# Patient Record
Sex: FEMALE | Race: WHITE | NOT HISPANIC OR LATINO | Employment: UNEMPLOYED | URBAN - METROPOLITAN AREA
[De-identification: names, ages, dates, MRNs, and addresses within clinical notes are randomized per-mention and may not be internally consistent; named-entity substitution may affect disease eponyms.]

---

## 2018-11-12 ENCOUNTER — HISTORICAL (OUTPATIENT)
Dept: LAB | Facility: HOSPITAL | Age: 48
End: 2018-11-12

## 2018-11-12 LAB
ABS NEUT (OLG): 3 X10(3)/MCL (ref 2.1–9.2)
ALBUMIN SERPL-MCNC: 4 GM/DL (ref 3.4–5)
ALBUMIN/GLOB SERPL: 1.4 RATIO (ref 1.1–2)
ALP SERPL-CCNC: 74 UNIT/L (ref 46–116)
ALT SERPL-CCNC: 21 UNIT/L (ref 12–78)
AST SERPL-CCNC: 13 UNIT/L (ref 15–37)
BASOPHILS # BLD AUTO: 0 X10(3)/MCL (ref 0–0.2)
BASOPHILS NFR BLD AUTO: 0 %
BILIRUB SERPL-MCNC: 0.4 MG/DL (ref 0.2–1)
BILIRUBIN DIRECT+TOT PNL SERPL-MCNC: 0.13 MG/DL (ref 0–0.2)
BILIRUBIN DIRECT+TOT PNL SERPL-MCNC: 0.27 MG/DL (ref 0–0.8)
BUN SERPL-MCNC: 10.2 MG/DL (ref 7–18)
CALCIUM SERPL-MCNC: 10 MG/DL (ref 8.5–10.1)
CHLORIDE SERPL-SCNC: 105 MMOL/L (ref 98–107)
CHOLEST SERPL-MCNC: 193 MG/DL (ref 0–200)
CHOLEST/HDLC SERPL: 3.5 {RATIO} (ref 0–4)
CO2 SERPL-SCNC: 31.9 MMOL/L (ref 21–32)
CREAT SERPL-MCNC: 0.64 MG/DL (ref 0.6–1.3)
DEPRECATED CALCIDIOL+CALCIFEROL SERPL-MC: 16.27 NG/ML (ref 30–80)
EOSINOPHIL # BLD AUTO: 0.1 X10(3)/MCL (ref 0–0.9)
EOSINOPHIL NFR BLD AUTO: 2 %
ERYTHROCYTE [DISTWIDTH] IN BLOOD BY AUTOMATED COUNT: 12.4 % (ref 11.5–17)
GLOBULIN SER-MCNC: 2.9 GM/DL (ref 2.4–3.5)
GLUCOSE SERPL-MCNC: 86 MG/DL (ref 74–106)
HCT VFR BLD AUTO: 44 % (ref 37–47)
HDLC SERPL-MCNC: 55 MG/DL (ref 40–60)
HGB BLD-MCNC: 14.4 GM/DL (ref 12–16)
IMM GRANULOCYTES # BLD AUTO: 0.01 % (ref 0–0.02)
IMM GRANULOCYTES NFR BLD AUTO: 0.2 % (ref 0–0.43)
LDLC SERPL CALC-MCNC: 122 MG/DL (ref 0–129)
LYMPHOCYTES # BLD AUTO: 1.9 X10(3)/MCL (ref 0.6–4.6)
LYMPHOCYTES NFR BLD AUTO: 36 %
MCH RBC QN AUTO: 31.3 PG (ref 27–31)
MCHC RBC AUTO-ENTMCNC: 32.7 GM/DL (ref 33–36)
MCV RBC AUTO: 95.7 FL (ref 80–94)
MONOCYTES # BLD AUTO: 0.3 X10(3)/MCL (ref 0.1–1.3)
MONOCYTES NFR BLD AUTO: 5 %
NEUTROPHILS # BLD AUTO: 3 X10(3)/MCL (ref 1.4–7.9)
NEUTROPHILS NFR BLD AUTO: 57 %
PLATELET # BLD AUTO: 195 X10(3)/MCL (ref 130–400)
PMV BLD AUTO: 10.6 FL (ref 9.4–12.4)
POTASSIUM SERPL-SCNC: 4.7 MMOL/L (ref 3.5–5.1)
PROT SERPL-MCNC: 6.9 GM/DL (ref 6.4–8.2)
RBC # BLD AUTO: 4.6 X10(6)/MCL (ref 4.2–5.4)
SODIUM SERPL-SCNC: 142 MMOL/L (ref 136–145)
TRIGL SERPL-MCNC: 79 MG/DL
TSH SERPL-ACNC: 2.66 MIU/ML (ref 0.36–3.74)
VLDLC SERPL CALC-MCNC: 16 MG/DL
WBC # SPEC AUTO: 5.3 X10(3)/MCL (ref 4.5–11.5)

## 2018-12-13 ENCOUNTER — HISTORICAL (OUTPATIENT)
Dept: RADIOLOGY | Facility: HOSPITAL | Age: 48
End: 2018-12-13

## 2019-01-09 ENCOUNTER — HISTORICAL (OUTPATIENT)
Dept: RADIOLOGY | Facility: HOSPITAL | Age: 49
End: 2019-01-09

## 2019-08-13 ENCOUNTER — HISTORICAL (OUTPATIENT)
Dept: LAB | Facility: HOSPITAL | Age: 49
End: 2019-08-13

## 2019-08-13 LAB
ABS NEUT (OLG): 3.39 X10(3)/MCL (ref 2.1–9.2)
ALBUMIN SERPL-MCNC: 4 GM/DL (ref 3.4–5)
ALBUMIN/GLOB SERPL: 1.2 RATIO (ref 1.1–2)
ALP SERPL-CCNC: 84 UNIT/L (ref 46–116)
ALT SERPL-CCNC: 24 UNIT/L (ref 12–78)
AST SERPL-CCNC: 15 UNIT/L (ref 15–37)
BASOPHILS # BLD AUTO: 0 X10(3)/MCL (ref 0–0.2)
BASOPHILS NFR BLD AUTO: 0 %
BILIRUB SERPL-MCNC: 0.4 MG/DL (ref 0.2–1)
BILIRUBIN DIRECT+TOT PNL SERPL-MCNC: 0.07 MG/DL (ref 0–0.2)
BILIRUBIN DIRECT+TOT PNL SERPL-MCNC: 0.33 MG/DL (ref 0–0.8)
BUN SERPL-MCNC: 14.6 MG/DL (ref 7–18)
CALCIUM SERPL-MCNC: 9.8 MG/DL (ref 8.5–10.1)
CHLORIDE SERPL-SCNC: 105 MMOL/L (ref 98–107)
CHOLEST SERPL-MCNC: 195 MG/DL (ref 0–200)
CHOLEST/HDLC SERPL: 4.1 {RATIO} (ref 0–4)
CO2 SERPL-SCNC: 29.4 MMOL/L (ref 21–32)
CREAT SERPL-MCNC: 0.71 MG/DL (ref 0.6–1.3)
DEPRECATED CALCIDIOL+CALCIFEROL SERPL-MC: 16.37 NG/ML (ref 30–80)
EOSINOPHIL # BLD AUTO: 0.1 X10(3)/MCL (ref 0–0.9)
EOSINOPHIL NFR BLD AUTO: 1 %
ERYTHROCYTE [DISTWIDTH] IN BLOOD BY AUTOMATED COUNT: 12.5 % (ref 11.5–17)
GLOBULIN SER-MCNC: 3.4 GM/DL (ref 2.4–3.5)
GLUCOSE SERPL-MCNC: 94 MG/DL (ref 74–106)
HCT VFR BLD AUTO: 43.2 % (ref 37–47)
HDLC SERPL-MCNC: 48 MG/DL (ref 40–60)
HGB BLD-MCNC: 14.2 GM/DL (ref 12–16)
LDLC SERPL CALC-MCNC: 133 MG/DL (ref 0–129)
LYMPHOCYTES # BLD AUTO: 1.6 X10(3)/MCL (ref 0.6–4.6)
LYMPHOCYTES NFR BLD AUTO: 30 %
MCH RBC QN AUTO: 30.7 PG (ref 27–31)
MCHC RBC AUTO-ENTMCNC: 32.9 GM/DL (ref 33–36)
MCV RBC AUTO: 93.3 FL (ref 80–94)
MONOCYTES # BLD AUTO: 0.3 X10(3)/MCL (ref 0.1–1.3)
MONOCYTES NFR BLD AUTO: 5 %
NEUTROPHILS # BLD AUTO: 3.39 X10(3)/MCL (ref 1.4–7.9)
NEUTROPHILS NFR BLD AUTO: 64 %
PLATELET # BLD AUTO: 207 X10(3)/MCL (ref 130–400)
PMV BLD AUTO: 9.9 FL (ref 9.4–12.4)
POTASSIUM SERPL-SCNC: 4.7 MMOL/L (ref 3.5–5.1)
PROT SERPL-MCNC: 7.4 GM/DL (ref 6.4–8.2)
RBC # BLD AUTO: 4.63 X10(6)/MCL (ref 4.2–5.4)
SODIUM SERPL-SCNC: 142 MMOL/L (ref 136–145)
TRIGL SERPL-MCNC: 69 MG/DL
TSH SERPL-ACNC: 3.54 MIU/ML (ref 0.36–3.74)
VIT B12 SERPL-MCNC: 299 PG/ML (ref 193–986)
VLDLC SERPL CALC-MCNC: 14 MG/DL
WBC # SPEC AUTO: 5.3 X10(3)/MCL (ref 4.5–11.5)

## 2019-12-17 ENCOUNTER — HISTORICAL (OUTPATIENT)
Dept: RADIOLOGY | Facility: HOSPITAL | Age: 49
End: 2019-12-17

## 2020-03-02 ENCOUNTER — HISTORICAL (OUTPATIENT)
Dept: LAB | Facility: HOSPITAL | Age: 50
End: 2020-03-02

## 2020-03-02 LAB
DEPRECATED CALCIDIOL+CALCIFEROL SERPL-MC: 22.07 NG/ML (ref 30–80)
VIT B12 SERPL-MCNC: 462 PG/ML (ref 193–986)

## 2020-08-03 ENCOUNTER — HISTORICAL (OUTPATIENT)
Dept: LAB | Facility: HOSPITAL | Age: 50
End: 2020-08-03

## 2020-08-03 LAB
ABS NEUT (OLG): 2.95 X10(3)/MCL (ref 2.1–9.2)
ALBUMIN SERPL-MCNC: 5.9 GM/DL (ref 3.4–5)
ALBUMIN/GLOB SERPL: 2.6 RATIO (ref 1.1–2)
ALP SERPL-CCNC: 90 UNIT/L (ref 46–116)
ALT SERPL-CCNC: 87 UNIT/L (ref 12–78)
AST SERPL-CCNC: 48 UNIT/L (ref 15–37)
BASOPHILS # BLD AUTO: 0 X10(3)/MCL (ref 0–0.2)
BASOPHILS NFR BLD AUTO: 0 %
BILIRUB SERPL-MCNC: 0.5 MG/DL (ref 0.2–1)
BILIRUBIN DIRECT+TOT PNL SERPL-MCNC: 0.11 MG/DL (ref 0–0.2)
BILIRUBIN DIRECT+TOT PNL SERPL-MCNC: 0.39 MG/DL (ref 0–0.8)
BUN SERPL-MCNC: 14.4 MG/DL (ref 7–18)
CALCIUM SERPL-MCNC: 10.5 MG/DL (ref 8.5–10.1)
CHLORIDE SERPL-SCNC: 104 MMOL/L (ref 98–107)
CHOLEST SERPL-MCNC: 228 MG/DL (ref 0–200)
CHOLEST/HDLC SERPL: 3.1 {RATIO} (ref 0–4)
CO2 SERPL-SCNC: 20.4 MMOL/L (ref 21–32)
CREAT SERPL-MCNC: 0.92 MG/DL (ref 0.6–1.3)
DEPRECATED CALCIDIOL+CALCIFEROL SERPL-MC: 23.1 NG/ML (ref 6.6–49.9)
EOSINOPHIL # BLD AUTO: 0.1 X10(3)/MCL (ref 0–0.9)
EOSINOPHIL NFR BLD AUTO: 2 %
ERYTHROCYTE [DISTWIDTH] IN BLOOD BY AUTOMATED COUNT: 12.7 % (ref 11.5–17)
GLOBULIN SER-MCNC: 2.3 GM/DL (ref 2.4–3.5)
GLUCOSE SERPL-MCNC: 114 MG/DL (ref 74–106)
HCT VFR BLD AUTO: 45.2 % (ref 37–47)
HDLC SERPL-MCNC: 74 MG/DL (ref 40–60)
HGB BLD-MCNC: 15.1 GM/DL (ref 12–16)
LDLC SERPL CALC-MCNC: 133 MG/DL (ref 0–129)
LYMPHOCYTES # BLD AUTO: 1.6 X10(3)/MCL (ref 0.6–4.6)
LYMPHOCYTES NFR BLD AUTO: 32 %
MCH RBC QN AUTO: 31.1 PG (ref 27–31)
MCHC RBC AUTO-ENTMCNC: 33.4 GM/DL (ref 33–36)
MCV RBC AUTO: 93.2 FL (ref 80–94)
MONOCYTES # BLD AUTO: 0.3 X10(3)/MCL (ref 0.1–1.3)
MONOCYTES NFR BLD AUTO: 6 %
NEUTROPHILS # BLD AUTO: 2.95 X10(3)/MCL (ref 1.4–7.9)
NEUTROPHILS NFR BLD AUTO: 60 %
PLATELET # BLD AUTO: 201 X10(3)/MCL (ref 130–400)
PMV BLD AUTO: 10 FL (ref 9.4–12.4)
POTASSIUM SERPL-SCNC: 4.7 MMOL/L (ref 3.5–5.1)
PROT SERPL-MCNC: 8.2 GM/DL (ref 6.4–8.2)
RBC # BLD AUTO: 4.85 X10(6)/MCL (ref 4.2–5.4)
SODIUM SERPL-SCNC: 141 MMOL/L (ref 136–145)
TRIGL SERPL-MCNC: 107 MG/DL
TSH SERPL-ACNC: 4.35 MIU/ML (ref 0.36–3.74)
VIT B12 SERPL-MCNC: 333 PG/ML (ref 193–986)
VLDLC SERPL CALC-MCNC: 21 MG/DL
WBC # SPEC AUTO: 4.9 X10(3)/MCL (ref 4.5–11.5)

## 2020-08-04 LAB
EST. AVERAGE GLUCOSE BLD GHB EST-MCNC: 105 MG/DL
HBA1C MFR BLD: 5.3 % (ref 4.5–6.2)

## 2021-01-21 ENCOUNTER — HISTORICAL (OUTPATIENT)
Dept: RADIOLOGY | Facility: HOSPITAL | Age: 51
End: 2021-01-21

## 2021-02-10 ENCOUNTER — HISTORICAL (OUTPATIENT)
Dept: RADIOLOGY | Facility: HOSPITAL | Age: 51
End: 2021-02-10

## 2021-03-02 ENCOUNTER — HISTORICAL (OUTPATIENT)
Dept: LAB | Facility: HOSPITAL | Age: 51
End: 2021-03-02

## 2021-03-02 LAB
CHOLEST SERPL-MCNC: 183 MG/DL
CHOLEST/HDLC SERPL: 4 {RATIO} (ref 0–5)
HDLC SERPL-MCNC: 46 MG/DL (ref 35–60)
LDLC SERPL CALC-MCNC: 120 MG/DL (ref 50–140)
TRIGL SERPL-MCNC: 87 MG/DL (ref 37–140)
TSH SERPL-ACNC: 2.81 UIU/ML (ref 0.35–4.94)
VLDLC SERPL CALC-MCNC: 17 MG/DL

## 2021-07-12 LAB — CRC RECOMMENDATION EXT: NORMAL

## 2021-08-25 ENCOUNTER — HISTORICAL (OUTPATIENT)
Dept: LAB | Facility: HOSPITAL | Age: 51
End: 2021-08-25

## 2021-08-25 LAB
ABS NEUT (OLG): 1.33 X10(3)/MCL (ref 2.1–9.2)
ALBUMIN SERPL-MCNC: 3.9 GM/DL (ref 3.5–5)
ALBUMIN/GLOB SERPL: 1.4 RATIO (ref 1.1–2)
ALP SERPL-CCNC: 76 UNIT/L (ref 40–150)
ALT SERPL-CCNC: 91 UNIT/L (ref 0–55)
AST SERPL-CCNC: 39 UNIT/L (ref 5–34)
BASOPHILS # BLD AUTO: 0 X10(3)/MCL (ref 0–0.2)
BASOPHILS NFR BLD AUTO: 0 %
BILIRUB SERPL-MCNC: 0.4 MG/DL
BILIRUBIN DIRECT+TOT PNL SERPL-MCNC: 0.1 MG/DL (ref 0–0.5)
BILIRUBIN DIRECT+TOT PNL SERPL-MCNC: 0.3 MG/DL (ref 0–0.8)
BUN SERPL-MCNC: 12.5 MG/DL (ref 9.8–20.1)
CALCIUM SERPL-MCNC: 9.1 MG/DL (ref 8.4–10.2)
CHLORIDE SERPL-SCNC: 109 MMOL/L (ref 98–107)
CHOLEST SERPL-MCNC: 162 MG/DL
CHOLEST/HDLC SERPL: 5 {RATIO} (ref 0–5)
CO2 SERPL-SCNC: 23 MMOL/L (ref 22–29)
CREAT SERPL-MCNC: 0.69 MG/DL (ref 0.55–1.02)
DEPRECATED CALCIDIOL+CALCIFEROL SERPL-MC: 22.3 NG/ML (ref 30–80)
EOSINOPHIL # BLD AUTO: 0.1 X10(3)/MCL (ref 0–0.9)
EOSINOPHIL NFR BLD AUTO: 0 %
ERYTHROCYTE [DISTWIDTH] IN BLOOD BY AUTOMATED COUNT: 12.1 % (ref 11.5–17)
EST. AVERAGE GLUCOSE BLD GHB EST-MCNC: 105.4 MG/DL
GLOBULIN SER-MCNC: 2.8 GM/DL (ref 2.4–3.5)
GLUCOSE SERPL-MCNC: 94 MG/DL (ref 74–100)
HBA1C MFR BLD: 5.3 %
HCT VFR BLD AUTO: 43.6 % (ref 37–47)
HDLC SERPL-MCNC: 33 MG/DL (ref 35–60)
HGB BLD-MCNC: 14.8 GM/DL (ref 12–16)
LDLC SERPL CALC-MCNC: 95 MG/DL (ref 50–140)
LYMPHOCYTES # BLD AUTO: 2.1 X10(3)/MCL (ref 0.6–4.6)
LYMPHOCYTES NFR BLD AUTO: 56 %
MCH RBC QN AUTO: 32.2 PG (ref 27–31)
MCHC RBC AUTO-ENTMCNC: 33.9 GM/DL (ref 33–36)
MCV RBC AUTO: 94.8 FL (ref 80–94)
MONOCYTES # BLD AUTO: 0.2 X10(3)/MCL (ref 0.1–1.3)
MONOCYTES NFR BLD AUTO: 7 %
NEUTROPHILS # BLD AUTO: 1.33 X10(3)/MCL (ref 2.1–9.2)
NEUTROPHILS NFR BLD AUTO: 36 %
PLATELET # BLD AUTO: 194 X10(3)/MCL (ref 130–400)
PMV BLD AUTO: 10.2 FL (ref 7.4–10.4)
POTASSIUM SERPL-SCNC: 4 MMOL/L (ref 3.5–5.1)
PROT SERPL-MCNC: 6.7 GM/DL (ref 6.4–8.3)
RBC # BLD AUTO: 4.6 X10(6)/MCL (ref 4.2–5.4)
SODIUM SERPL-SCNC: 141 MMOL/L (ref 136–145)
TRIGL SERPL-MCNC: 168 MG/DL (ref 37–140)
TSH SERPL-ACNC: 2.29 UIU/ML (ref 0.35–4.94)
VLDLC SERPL CALC-MCNC: 34 MG/DL
WBC # SPEC AUTO: 3.7 X10(3)/MCL (ref 4.5–11.5)

## 2022-02-04 ENCOUNTER — HISTORICAL (OUTPATIENT)
Dept: RADIOLOGY | Facility: HOSPITAL | Age: 52
End: 2022-02-04

## 2022-02-04 ENCOUNTER — HISTORICAL (OUTPATIENT)
Dept: ADMINISTRATIVE | Facility: HOSPITAL | Age: 52
End: 2022-02-04

## 2022-04-06 ENCOUNTER — HISTORICAL (OUTPATIENT)
Dept: PHYSICAL THERAPY | Facility: HOSPITAL | Age: 52
End: 2022-04-06

## 2022-04-11 ENCOUNTER — HISTORICAL (OUTPATIENT)
Dept: ADMINISTRATIVE | Facility: HOSPITAL | Age: 52
End: 2022-04-11
Payer: MEDICAID

## 2022-04-13 ENCOUNTER — HISTORICAL (OUTPATIENT)
Dept: PHYSICAL THERAPY | Facility: HOSPITAL | Age: 52
End: 2022-04-13
Payer: MEDICAID

## 2022-04-19 ENCOUNTER — HISTORICAL (OUTPATIENT)
Dept: PHYSICAL THERAPY | Facility: HOSPITAL | Age: 52
End: 2022-04-19
Payer: MEDICAID

## 2022-04-21 ENCOUNTER — HISTORICAL (OUTPATIENT)
Dept: PHYSICAL THERAPY | Facility: HOSPITAL | Age: 52
End: 2022-04-21
Payer: MEDICAID

## 2022-04-25 ENCOUNTER — HISTORICAL (OUTPATIENT)
Dept: PHYSICAL THERAPY | Facility: HOSPITAL | Age: 52
End: 2022-04-25
Payer: MEDICAID

## 2022-04-27 ENCOUNTER — HISTORICAL (OUTPATIENT)
Dept: PHYSICAL THERAPY | Facility: HOSPITAL | Age: 52
End: 2022-04-27
Payer: MEDICAID

## 2022-04-28 VITALS
DIASTOLIC BLOOD PRESSURE: 68 MMHG | SYSTOLIC BLOOD PRESSURE: 96 MMHG | BODY MASS INDEX: 42.15 KG/M2 | HEIGHT: 63 IN | WEIGHT: 237.88 LBS | OXYGEN SATURATION: 98 %

## 2022-05-03 DIAGNOSIS — M25.511 RIGHT SHOULDER PAIN: Primary | ICD-10-CM

## 2022-05-04 ENCOUNTER — CLINICAL SUPPORT (OUTPATIENT)
Dept: REHABILITATION | Facility: HOSPITAL | Age: 52
End: 2022-05-04
Payer: MEDICAID

## 2022-05-04 DIAGNOSIS — M25.611 DECREASED RANGE OF MOTION OF RIGHT SHOULDER: ICD-10-CM

## 2022-05-04 DIAGNOSIS — M25.511 RIGHT SHOULDER PAIN, UNSPECIFIED CHRONICITY: ICD-10-CM

## 2022-05-04 PROCEDURE — 97110 THERAPEUTIC EXERCISES: CPT | Mod: CQ

## 2022-05-04 NOTE — PROGRESS NOTES
[facility]   Physical Therapy Treatment Note     Name: Meme Arreola  Clinic Number: [id number]    Therapy Diagnosis: No diagnosis found.  Physician: Mary Fermin F*    Visit Date: 5/4/2022        PTA Visit #: 2/5     Time In: 0928  Time Out: 1000  Total Billable Time: 30 minutes    SUBJECTIVE     Pt reports: No complaints.  She was compliant with home exercise program.  Response to previous treatment: good  Functional change: improving    Pain: 0/10  Location: right shoulder    OBJECTIVE     Objective Measures updated at progress report unless specified.     Treatment     Dulce received the treatments listed below:      therapeutic exercises to develop strength and ROM for 30 minutes. See exercise flow sheet.        Patient Education and Home Exercises     Home Exercises Provided and Patient Education Provided     Education provided:   - Continue participation in HEP.       ASSESSMENT     Pt completed all exercises with good effort.     Dulce Is progressing well towards her goals.   Pt prognosis is Excellent.     Pt will continue to benefit from skilled outpatient physical therapy to address the deficits listed in the problem list box on initial evaluation, provide pt/family education and to maximize pt's level of independence in the home and community environment.     Pt's spiritual, cultural and educational needs considered and pt agreeable to plan of care and goals.         Goals: Long Term Goals   Patient/Caregiver Goals :   be able to sweep and mop again   [place]or PT, Rahmarcella Love - 4/25/2022 10:42 CDT   Long Term Goals     Long Term Goal 1  Long Term Goal 2  Long Term Goal 3  Long Term Goal 4    Goal :    Patient will be able to perform all ADLs without pain   Other: Pt will increase R sh ROM to WNL to help with reaching overhead and washing her back.   Other: Pt will increase sh flex/abd strength to 5/5 to help with ADLs.   Other: Pt will have bilateral symmetry with  apleys scratch test so pt will be able to wash back and do hair with both arms     Status :    Progressing, continue   Goal met   Progressing, continue   Progressing, continue     Date Met :       4/25/2022 CDT           Time Frame :    Six weeks   Six weeks   Six weeks   Six weeks                Long Term Goal 5          Goal :    Other: Pt will decrease Quick DASH to 15 or less to show improved function and increased ability to perform sweeping and mopping              Status :    Progressing, continue              Date Met :                  Time Frame :    Six weeks                       PT Short Term Goals   Other PT Goals Grid     Goal #1  Goal #2  Goal #3      Goals :    Pt will increase R sh ROM flex/abd to 140   Pt will increase R sh ER by 10 degrees   Pt will decrease pain with ADLs to 4/10        Status :    Goal met   Progressing, continue   Goal met        Date Met :    4/25/2022 CDT              Time Frame :    Three weeks   Three weeks   Three weeks                     PLAN     Continue to progress as tolerated per POC.    Sai Mcneal, PTA

## 2022-05-06 ENCOUNTER — DOCUMENTATION ONLY (OUTPATIENT)
Dept: REHABILITATION | Facility: HOSPITAL | Age: 52
End: 2022-05-06

## 2022-05-06 ENCOUNTER — CLINICAL SUPPORT (OUTPATIENT)
Dept: REHABILITATION | Facility: HOSPITAL | Age: 52
End: 2022-05-06
Payer: MEDICAID

## 2022-05-06 DIAGNOSIS — M25.511 ACUTE PAIN OF RIGHT SHOULDER: Primary | ICD-10-CM

## 2022-05-06 DIAGNOSIS — M25.611 DECREASED RANGE OF MOTION OF RIGHT SHOULDER: ICD-10-CM

## 2022-05-06 PROCEDURE — 97110 THERAPEUTIC EXERCISES: CPT

## 2022-05-06 NOTE — PROGRESS NOTES
Physical therapist and physical therapy assistant(s) met face to face to discuss patient's treatment plan and progress towards established goals. Pt will be seen by a physical therapist minimally every 6th visit or every 30 days.    Sharmila Gallegos, PT

## 2022-05-06 NOTE — PROGRESS NOTES
[facility]   Physical Therapy Treatment Note     Name: Meme Arreola  Clinic Number: [id number]    Therapy Diagnosis:   Encounter Diagnoses   Name Primary?    Acute pain of right shoulder Yes    Decreased range of motion of right shoulder      Physician: Mary Fermin F*    Visit Date: 5/6/2022        PTA Visit #: 2 /5     Time In: 9:30 am  Time Out: 10:00 am  Total Billable Time: 30 minutes    SUBJECTIVE     Pt reports: Pain is currently 0/10 after taking ibuprofen.  Pain at worst is 4/10 with housework.  She was compliant with home exercise program.  Response to previous treatment: decreasing pain complaints and ability to complete more activities easier.  Functional change: improved use of R UE    Pain: 0/10  Location: right shoulder      OBJECTIVE     Objective Measures updated at progress report unless specified.     Treatment     Dulce received the treatments listed below:      therapeutic exercises to develop strength, endurance and ROM for 30 minutes including:  See flowsheet for exercises            Patient Education and Home Exercises     Home Exercises Provided and Patient Education Provided     Education provided:   - no new exercises added    Written Home Exercises Provided: Patient instructed to cont prior HEP. Exercises were reviewed and Dulce was able to demonstrate them prior to the end of the session.  Dulce demonstrated good  understanding of the education provided. See EMR under Patient Instructions for exercises provided during therapy sessions    ASSESSMENT     Patient is progressing well with full shoulder ROM noted.  Mild soreness after visit but no pain.    Dulce Is progressing well towards her goals.   Pt prognosis is Excellent.     Pt will continue to benefit from skilled outpatient physical therapy to address the deficits listed in the problem list box on initial evaluation, provide pt/family education and to maximize pt's level of independence  in the home and community environment.     Pt's spiritual, cultural and educational needs considered and pt agreeable to plan of care and goals.     Anticipated barriers to physical therapy: none    Goals: unchanged from last visit.  Progressing toward goals.    PLAN     Continue PT 2x/week for strengthening and endurance exercises.    Sharmila Gallegos, PT

## 2022-05-10 ENCOUNTER — CLINICAL SUPPORT (OUTPATIENT)
Dept: REHABILITATION | Facility: HOSPITAL | Age: 52
End: 2022-05-10
Payer: MEDICAID

## 2022-05-10 DIAGNOSIS — G89.29 CHRONIC RIGHT SHOULDER PAIN: Primary | ICD-10-CM

## 2022-05-10 DIAGNOSIS — M25.611 DECREASED RANGE OF MOTION OF RIGHT SHOULDER: ICD-10-CM

## 2022-05-10 DIAGNOSIS — M25.511 CHRONIC RIGHT SHOULDER PAIN: Primary | ICD-10-CM

## 2022-05-10 PROCEDURE — 97110 THERAPEUTIC EXERCISES: CPT | Mod: CQ

## 2022-05-10 NOTE — PROGRESS NOTES
[facility]   Physical Therapy Treatment Note     Name: Meme Arreola  Clinic Number: [id number]    Therapy Diagnosis:   No diagnosis found.  Physician: Mary Fermin F*    Visit Date: 5/10/2022        PTA Visit #: 1/5     Time In: 0830  Time Out: 0900  Total Billable Time: 30 minutes    SUBJECTIVE     Pt reports: Pain is currently 0/10 after taking ibuprofen.  Pain at worst is 4/10 with housework.  She was compliant with home exercise program.  Response to previous treatment: decreasing pain complaints and ability to complete more activities easier.  Functional change: improved use of R UE    Pain: 0/10  Location: right shoulder      OBJECTIVE   Unable to obtain Quick DASH score, however results indicate no pain and no difficulty with any tasks, except for mild difficulty with opening a tight new jar, and recreational activities in which you take some force or impact through the shoulder. This shows improvement in nearly all categories.     Treatment     Dulce received the treatments listed below:      therapeutic exercises to develop strength, endurance and ROM for 30 minutes including: Seated Rows , Lat Pulls, Theraband Rows , Prone YTA's , Wall [place], Serratus Punches and UE Hand Bike Shoulder Internal Rotation, Shoulder External Rotation, Shoulder Extension and Resisted AROM Flexion/Abduction.              Patient Education and Home Exercises     Home Exercises Provided and Patient Education Provided     Education provided:   - no new exercises added    Written Home Exercises Provided: Patient instructed to cont prior HEP. Exercises were reviewed and Dulce was able to demonstrate them prior to the end of the session.  Dulce demonstrated good  understanding of the education provided. See EMR under Patient Instructions for exercises provided during therapy sessions    ASSESSMENT     Patient is progressing well with full shoulder ROM noted.  Mild soreness after visit but no  pain.    Dulce Is progressing well towards her goals.   Pt prognosis is Excellent.     Pt will continue to benefit from skilled outpatient physical therapy to address the deficits listed in the problem list box on initial evaluation, provide pt/family education and to maximize pt's level of independence in the home and community environment.     Pt's spiritual, cultural and educational needs considered and pt agreeable to plan of care and goals.     Anticipated barriers to physical therapy: none    Goals: unchanged from last visit.  Progressing toward goals.    PLAN     Continue PT 2x/week for strengthening and endurance exercises.    Sai Mcneal, PTA

## 2022-05-13 ENCOUNTER — CLINICAL SUPPORT (OUTPATIENT)
Dept: REHABILITATION | Facility: HOSPITAL | Age: 52
End: 2022-05-13
Attending: NURSE PRACTITIONER
Payer: MEDICAID

## 2022-05-13 DIAGNOSIS — M25.511 ACUTE PAIN OF RIGHT SHOULDER: Primary | ICD-10-CM

## 2022-05-13 DIAGNOSIS — M25.611 DECREASED RANGE OF MOTION OF RIGHT SHOULDER: ICD-10-CM

## 2022-05-13 PROCEDURE — 97110 THERAPEUTIC EXERCISES: CPT

## 2022-05-13 NOTE — PROGRESS NOTES
[facility]   Physical Therapy Treatment Note     Name: Meme Arreola  Clinic Number: [id number]    Therapy Diagnosis:   Encounter Diagnoses   Name Primary?    Acute pain of right shoulder Yes    Decreased range of motion of right shoulder      Physician: Mary Fermin F*    Visit Date: 5/13/2022        [facility] Visit #: 1/5     Time In: 930   Time Out: 1000  Total Billable Time: 30 minutes    SUBJECTIVE     Pt reports: Pain is currently 0/10 after taking ibuprofen.  Pain at worst is 4/10 with housework.  She was compliant with home exercise program.  Response to previous treatment: decreasing pain complaints and ability to complete more activities easier.  Functional change: improved use of R UE    Pain: 0/10  Location: right shoulder      OBJECTIVE     None today    Treatment     Dulce received the treatments listed below:      therapeutic exercises to develop strength, endurance and ROM for 30 minutes including: Seated Rows , Lat Pulls, Theraband Rows , Prone YTA's , Wall [place], Serratus Punches and UE Hand Bike Shoulder Internal Rotation, Shoulder External Rotation, Shoulder Extension and Resisted AROM Flexion/Abduction. Resistance increased to Black theraband with exercises.              Patient Education and Home Exercises     Home Exercises Provided and Patient Education Provided     Education provided:   - no new exercises added    Written Home Exercises Provided: Patient instructed to cont prior HEP. Exercises were reviewed and Dulce was able to demonstrate them prior to the end of the session.  Dulce demonstrated good  understanding of the education provided. See EMR under Patient Instructions for exercises provided during therapy sessions    ASSESSMENT     Patient is progressing well with full shoulder ROM noted.  Mild soreness after visit but no pain. Therapist discussed d/c with patient when authorization is complete. Patient agrees that she can continue  independently and manage her problem.    Dulce Is progressing well towards her goals.   Pt prognosis is Excellent.     Pt will continue to benefit from skilled outpatient physical therapy to address the deficits listed in the problem list box on initial evaluation, provide pt/family education and to maximize pt's level of independence in the home and community environment.     Pt's spiritual, cultural and educational needs considered and pt agreeable to plan of care and goals.     Anticipated barriers to physical therapy: none    Goals: unchanged from last visit.  Progressing toward goals.    PLAN     Continue PT 2x/week for strengthening and endurance exercises.    Sharmila Gallegos, PT

## 2022-05-18 ENCOUNTER — CLINICAL SUPPORT (OUTPATIENT)
Dept: REHABILITATION | Facility: HOSPITAL | Age: 52
End: 2022-05-18
Payer: MEDICAID

## 2022-05-18 DIAGNOSIS — M25.611 DECREASED RANGE OF MOTION OF RIGHT SHOULDER: ICD-10-CM

## 2022-05-18 DIAGNOSIS — M25.511 ACUTE PAIN OF RIGHT SHOULDER: Primary | ICD-10-CM

## 2022-05-18 PROCEDURE — 97110 THERAPEUTIC EXERCISES: CPT

## 2022-05-18 NOTE — PROGRESS NOTES
[facility]   Physical Therapy Discharge Note    Name: Meme Arreola  Clinic Number: [id number]    Therapy Diagnosis:   Encounter Diagnoses   Name Primary?    Acute pain of right shoulder Yes    Decreased range of motion of right shoulder      Physician: Mary Fermin F*    Physician Orders: eval and treat   Medical Diagnosis: R shoulder subacromial pain syndrome  Evaluation Date: 4/6/22    Date of Last visit: 5/18/22  Total Visits Received: 10    Time In: 10:30 am  Time Out: 11:00 am  Total Billable Time: 30 minutes    SUBJECTIVE     Pt reports: that she remains pain free.  She was compliant with home exercise program.  Response to previous treatment: mild soreness  Functional change: patient has returned to PLOF    Pain: 0/10  Location: right shoulder      OBJECTIVE     ROM is WNL B UE and 5/5 strength B UE.  All special tests are negative  No pain with any activities.    Treatment     Dulce received the treatments listed below:      therapeutic exercises to develop strength, endurance, and ROM for 30 minutes including:  Seated Rows , Lat Pulls, Theraband Rows , Prone YTA's , Wall [place], Serratus Punches and UE Hand Bike Shoulder Internal Rotation, Shoulder External Rotation, Shoulder Extension and Resisted AROM Flexion/Abduction. Resistance used includes black theraband, 5-10# dumbbells, and universal gym with 60-70# resistance.      Patient Education and Home Exercises     Home Exercises Provided and Patient Education Provided     Education provided:   - Encouraged patient to continue HEP to prevent recurrence    Written Home Exercises Provided: Patient instructed to cont prior HEP. Exercises were reviewed and Dulce was able to demonstrate them prior to the end of the session.  Dulce demonstrated good  understanding of the education provided. See EMR under Patient Instructions for exercises provided during therapy sessions    ASSESSMENT     Patient has made excellent  progress.  She is discharging 2 visits early due to continuing to be pain free and independent with all activities at home. She is also independent with her home exercise program.    Dulce Is progressing well towards her goals.   Pt prognosis is Excellent.     Pt's spiritual, cultural and educational needs considered and pt agreeable to plan of care and goals.     Anticipated barriers to physical therapy: none    Discharge reason: Patient has met all of his/her goals    Discharge Quick DASH Score: 0    Goals:   Short Term Goals (3 weeks):  1. Patient will increase R shoulder ROM in flexion/abduction to 140 degrees (Goal Met).  2. Patient will increase R shoulder external rotation by 10 degrees (Goal Met).  3. Patient will decrease pain with ADLs to 4/10 (Goal Met).    Long Term Goals (6 weeks):  1. Patient will be able to perform all ADLs without pain (Goal Met).  2. Patient will increase R shoulder ROM to WNL to help with reachiign overhead and washing her back (Goal Met).  3. Patient will increase shoulder flexion/abduction strength to 5/5 to help with ADLs (Goal Met).  4. Patient will have bilater symmetry with apleys scratch test so patient will be able to wash her back and style her hair with both arms (Goal Met).  5. Patient will decrease Quick DASH to 15 or less to show improved function and increased ability to perform sweeping and mopping (Goal Met).    PLAN   This patient is discharged from Physical Therapy      Sharmila Gallegos, PT

## 2023-02-28 ENCOUNTER — OFFICE VISIT (OUTPATIENT)
Dept: FAMILY MEDICINE | Facility: CLINIC | Age: 53
End: 2023-02-28
Payer: MEDICAID

## 2023-02-28 VITALS
HEART RATE: 68 BPM | RESPIRATION RATE: 18 BRPM | WEIGHT: 245.81 LBS | HEIGHT: 63 IN | TEMPERATURE: 98 F | BODY MASS INDEX: 43.55 KG/M2 | OXYGEN SATURATION: 99 % | SYSTOLIC BLOOD PRESSURE: 133 MMHG | DIASTOLIC BLOOD PRESSURE: 84 MMHG

## 2023-02-28 DIAGNOSIS — Z00.00 WELL ADULT EXAM: ICD-10-CM

## 2023-02-28 DIAGNOSIS — M47.812 CERVICAL SPONDYLOSIS: Primary | ICD-10-CM

## 2023-02-28 DIAGNOSIS — Z12.31 BREAST CANCER SCREENING BY MAMMOGRAM: ICD-10-CM

## 2023-02-28 PROCEDURE — 1159F MED LIST DOCD IN RCRD: CPT | Mod: CPTII,,, | Performed by: NURSE PRACTITIONER

## 2023-02-28 PROCEDURE — 3008F PR BODY MASS INDEX (BMI) DOCUMENTED: ICD-10-PCS | Mod: CPTII,,, | Performed by: NURSE PRACTITIONER

## 2023-02-28 PROCEDURE — 4010F ACE/ARB THERAPY RXD/TAKEN: CPT | Mod: CPTII,,, | Performed by: NURSE PRACTITIONER

## 2023-02-28 PROCEDURE — 3075F PR MOST RECENT SYSTOLIC BLOOD PRESS GE 130-139MM HG: ICD-10-PCS | Mod: CPTII,,, | Performed by: NURSE PRACTITIONER

## 2023-02-28 PROCEDURE — 99214 OFFICE O/P EST MOD 30 MIN: CPT | Mod: ,,, | Performed by: NURSE PRACTITIONER

## 2023-02-28 PROCEDURE — 3079F DIAST BP 80-89 MM HG: CPT | Mod: CPTII,,, | Performed by: NURSE PRACTITIONER

## 2023-02-28 PROCEDURE — 99214 PR OFFICE/OUTPT VISIT, EST, LEVL IV, 30-39 MIN: ICD-10-PCS | Mod: ,,, | Performed by: NURSE PRACTITIONER

## 2023-02-28 PROCEDURE — 3079F PR MOST RECENT DIASTOLIC BLOOD PRESSURE 80-89 MM HG: ICD-10-PCS | Mod: CPTII,,, | Performed by: NURSE PRACTITIONER

## 2023-02-28 PROCEDURE — 3075F SYST BP GE 130 - 139MM HG: CPT | Mod: CPTII,,, | Performed by: NURSE PRACTITIONER

## 2023-02-28 PROCEDURE — 3008F BODY MASS INDEX DOCD: CPT | Mod: CPTII,,, | Performed by: NURSE PRACTITIONER

## 2023-02-28 PROCEDURE — 1159F PR MEDICATION LIST DOCUMENTED IN MEDICAL RECORD: ICD-10-PCS | Mod: CPTII,,, | Performed by: NURSE PRACTITIONER

## 2023-02-28 PROCEDURE — 4010F PR ACE/ARB THEARPY RXD/TAKEN: ICD-10-PCS | Mod: CPTII,,, | Performed by: NURSE PRACTITIONER

## 2023-02-28 RX ORDER — GABAPENTIN 300 MG/1
300 CAPSULE ORAL 3 TIMES DAILY
Qty: 90 CAPSULE | Refills: 11 | Status: SHIPPED | OUTPATIENT
Start: 2023-02-28 | End: 2024-03-11

## 2023-02-28 RX ORDER — CYCLOBENZAPRINE HCL 10 MG
10 TABLET ORAL NIGHTLY PRN
Qty: 30 TABLET | Refills: 0 | Status: SHIPPED | OUTPATIENT
Start: 2023-02-28 | End: 2023-04-10 | Stop reason: SDUPTHER

## 2023-02-28 RX ORDER — PREDNISONE 20 MG/1
20 TABLET ORAL DAILY
Qty: 5 TABLET | Refills: 0 | Status: SHIPPED | OUTPATIENT
Start: 2023-02-28 | End: 2023-03-05

## 2023-02-28 NOTE — PROGRESS NOTES
Patient ID: [id number]     Chief Complaint: Neck Pain      HPI:     Meme Arreola is a 52 y.o. female here today with complaints of neck pain x1 year.  Patient has a past medical history of cervical spondylosis.  Patient reports mild relief with ibuprofen 800 mg.  Patient reports numbness and tingling to bilateral upper extremities.  Patient rates pain 6/10 in office.  Patient reports pain is worse when doing household chores such as sweeping and dishes.  Pain is relieved with rest.    Past Medical History:  has a past medical history of Cervical spondylosis, Cervicalgia, Hypertension, Hypothyroidism, unspecified, Neck pain, Neuropathy, Nicotine dependence, cigarettes, uncomplicated, Obesity, unspecified, Polyneuropathy, and Shoulder joint pain.    Surgical History:  has a past surgical history that includes Hysterectomy.    Family History: family history is not on file.    Social History:  reports that she has been smoking cigarettes. She has been smoking an average of .05 packs per day. She has never used smokeless tobacco.    Current Outpatient Medications   Medication Instructions    busPIRone (BUSPAR) 10 MG tablet TAKE ONE TABLET BY MOUTH 3 TIMES A DAY    cyclobenzaprine (FLEXERIL) 10 mg, Oral, Nightly PRN    gabapentin (NEURONTIN) 300 mg, Oral, 3 times daily    ibuprofen (ADVIL,MOTRIN) 800 MG tablet TAKE ONE TABLET BY MOUTH 3 TIMES A DAY AS NEEDED FOR PAIN    levocetirizine (XYZAL) 5 MG tablet TAKE ONE TABLET BY MOUTH EACH EVENING.    lisinopriL (PRINIVIL,ZESTRIL) 20 MG tablet TAKE 1 TABLET BY MOUTH DAILY    predniSONE (DELTASONE) 20 mg, Oral, Daily       Patient is allergic to penicillin.     Patient Care Team:  JOE Montemayor as PCP - General (Nurse Practitioner)       Subjective:     Review of Systems    12 point review of systems conducted, negative except as stated in the history of present illness. See HPI for details.      Objective:     Visit Vitals  /84 (BP Location: Left arm,  "Patient Position: Sitting)   Pulse 68   Temp 97.9 °F (36.6 °C) (Oral)   Resp 18   Ht 5' 2.99" (1.6 m)   Wt 111.5 kg (245 lb 12.8 oz)   SpO2 99%   BMI 43.55 kg/m²       Physical Exam    General: Alert and oriented, No acute distress.  Head: Normocephalic, Atraumatic.  Eye: Pupils are equal, round and reactive to light, Extraocular movements are intact, Sclera non-icteric.  Ears/Nose/Throat: Normal, Mucosa moist,Clear.  Neck/Thyroid: Supple, Non-tender, No carotid bruit, No lymphadenopathy, No JVD, Full range of motion.  Respiratory: Clear to auscultation bilaterally; No wheezes, rales or rhonchi,Non-labored respirations, Symmetrical chest wall expansion.  Cardiovascular: Regular rate and rhythm, S1/S2 normal, No murmurs, rubs or gallops.  Gastrointestinal: Soft, Non-tender, Non-distended, Normal bowel sounds, No palpable organomegaly.  Musculoskeletal:  Decreased range of motion to cervical spine.  Integumentary: Warm, Dry, Intact, No suspicious lesions or rashes.  Extremities: No clubbing, cyanosis or edema  Neurologic: No focal deficits, Cranial Nerves II-XII are grossly intact, Motor strength normal upper and lower extremities, Sensory exam intact.  Psychiatric: Normal interaction, Coherent speech, Euthymic mood, Appropriate affect     Labs Reviewed:     Chemistry:  Lab Results   Component Value Date     08/25/2021    K 4.0 08/25/2021    CHLORIDE 109 (H) 08/25/2021    BUN 12.5 08/25/2021    CREATININE 0.69 08/25/2021    GLUCOSE 94 08/25/2021    CALCIUM 9.1 08/25/2021    ALKPHOS 76 08/25/2021    LABPROT 6.7 08/25/2021    ALBUMIN 3.9 08/25/2021    BILIDIR 0.1 08/25/2021    IBILI 0.30 08/25/2021    AST 39 (H) 08/25/2021    ALT 91 (H) 08/25/2021    MG 2.2 06/09/2017    [id number] 22.3 (L) 08/25/2021    TSH 2.2930 08/25/2021        Lab Results   Component Value Date    HGBA1C 5.3 08/25/2021        Hematology:  Lab Results   Component Value Date    WBC 3.7 (L) 08/25/2021    HGB 14.8 08/25/2021    HCT 43.6 " 08/25/2021     08/25/2021       Lipid Panel:  Lab Results   Component Value Date    CHOL 162 08/25/2021    HDL 33 (L) 08/25/2021    LDL 95.00 08/25/2021    TRIG 168 (H) 08/25/2021    TOTALCHOLEST 5 08/25/2021        Urine:  Lab Results   Component Value Date    APPEARANCEUA Clear 06/09/2017    PHUA 5.5 06/09/2017    PROTEINUA Negative 06/09/2017    LEUKOCYTESUR Negative 06/09/2017    RBCUA None Seen 06/09/2017    BACTERIA None Seen 06/09/2017          Assessment:       ICD-10-CM ICD-9-CM   1. Cervical spondylosis  M47.812 721.0   2. Well adult exam  Z00.00 V70.0   3. Breast cancer screening by mammogram  Z12.31 V76.12        Plan:   1. Cervical spondylosis  - Ambulatory referral/consult to Orthopedics; Future  Prednisone 20 mg p.o. daily x5 days.  Gabapentin 300 mg p.o. t.i.d..  Cyclobenzaprine 10 mg p.o. at bedtime as needed for spasms.    2. Well adult exam  - CBC Auto Differential; Future  - Comprehensive Metabolic Panel; Future  - Lipid Panel; Future  - TSH; Future  - Hemoglobin A1C; Future  - Urinalysis; Future  - Vitamin D; Future  - Microalbumin/Creatinine Ratio, Urine; Future  - Urinalysis    3. Breast cancer screening by mammogram  - Mammo Digital Screening Bilat w/ William; Future         Follow up in about 4 weeks (around 3/28/2023) for Wellness. In addition to their scheduled follow up, the patient has also been instructed to follow up on as needed basis.     Future Appointments   Date Time Provider Department Center   4/10/2023  8:00 AM JOE Montemayor [facility]        JOE Montemayor

## 2023-03-07 ENCOUNTER — HOSPITAL ENCOUNTER (OUTPATIENT)
Dept: RADIOLOGY | Facility: HOSPITAL | Age: 53
Discharge: HOME | End: 2023-03-07
Attending: NURSE PRACTITIONER
Payer: MEDICAID

## 2023-03-07 DIAGNOSIS — Z12.31 BREAST CANCER SCREENING BY MAMMOGRAM: ICD-10-CM

## 2023-03-07 PROCEDURE — 77067 MAMMO DIGITAL SCREENING BILAT WITH TOMO: ICD-10-PCS | Mod: 26,,, | Performed by: RADIOLOGY

## 2023-03-07 PROCEDURE — 77067 SCR MAMMO BI INCL CAD: CPT | Mod: TC

## 2023-03-07 PROCEDURE — 77063 MAMMO DIGITAL SCREENING BILAT WITH TOMO: ICD-10-PCS | Mod: 26,,, | Performed by: RADIOLOGY

## 2023-03-07 PROCEDURE — 77063 BREAST TOMOSYNTHESIS BI: CPT | Mod: 26,,, | Performed by: RADIOLOGY

## 2023-03-07 PROCEDURE — 77067 SCR MAMMO BI INCL CAD: CPT | Mod: 26,,, | Performed by: RADIOLOGY

## 2023-03-08 ENCOUNTER — PATIENT MESSAGE (OUTPATIENT)
Dept: ADMINISTRATIVE | Facility: HOSPITAL | Age: 53
End: 2023-03-08
Payer: MEDICAID

## 2023-04-03 ENCOUNTER — PATIENT OUTREACH (OUTPATIENT)
Dept: ADMINISTRATIVE | Facility: HOSPITAL | Age: 53
End: 2023-04-03
Payer: MEDICAID

## 2023-04-03 NOTE — PROGRESS NOTES
Health Maintenance .    COLONOSCOPY    - No record of colon cancer screening found     Population Health Outreach.

## 2023-04-05 NOTE — PROGRESS NOTES
The following record(s)  below were uploaded for Health Maintenance .    COLONOSCOPY     07/12/2021

## 2023-04-10 ENCOUNTER — OFFICE VISIT (OUTPATIENT)
Dept: FAMILY MEDICINE | Facility: CLINIC | Age: 53
End: 2023-04-10
Payer: MEDICAID

## 2023-04-10 VITALS
BODY MASS INDEX: 44.72 KG/M2 | TEMPERATURE: 98 F | DIASTOLIC BLOOD PRESSURE: 84 MMHG | RESPIRATION RATE: 18 BRPM | SYSTOLIC BLOOD PRESSURE: 131 MMHG | OXYGEN SATURATION: 98 % | HEART RATE: 80 BPM | WEIGHT: 252.38 LBS | HEIGHT: 63 IN

## 2023-04-10 DIAGNOSIS — E55.9 VITAMIN D DEFICIENCY: ICD-10-CM

## 2023-04-10 DIAGNOSIS — Z00.00 WELL ADULT EXAM: Primary | ICD-10-CM

## 2023-04-10 DIAGNOSIS — E03.9 HYPOTHYROIDISM, UNSPECIFIED TYPE: ICD-10-CM

## 2023-04-10 PROCEDURE — 4010F ACE/ARB THERAPY RXD/TAKEN: CPT | Mod: CPTII,,, | Performed by: NURSE PRACTITIONER

## 2023-04-10 PROCEDURE — 3008F PR BODY MASS INDEX (BMI) DOCUMENTED: ICD-10-PCS | Mod: CPTII,,, | Performed by: NURSE PRACTITIONER

## 2023-04-10 PROCEDURE — 99396 PR PREVENTIVE VISIT,EST,40-64: ICD-10-PCS | Mod: ,,, | Performed by: NURSE PRACTITIONER

## 2023-04-10 PROCEDURE — 4010F PR ACE/ARB THEARPY RXD/TAKEN: ICD-10-PCS | Mod: CPTII,,, | Performed by: NURSE PRACTITIONER

## 2023-04-10 PROCEDURE — 3079F PR MOST RECENT DIASTOLIC BLOOD PRESSURE 80-89 MM HG: ICD-10-PCS | Mod: CPTII,,, | Performed by: NURSE PRACTITIONER

## 2023-04-10 PROCEDURE — 3008F BODY MASS INDEX DOCD: CPT | Mod: CPTII,,, | Performed by: NURSE PRACTITIONER

## 2023-04-10 PROCEDURE — 99396 PREV VISIT EST AGE 40-64: CPT | Mod: ,,, | Performed by: NURSE PRACTITIONER

## 2023-04-10 PROCEDURE — 3075F PR MOST RECENT SYSTOLIC BLOOD PRESS GE 130-139MM HG: ICD-10-PCS | Mod: CPTII,,, | Performed by: NURSE PRACTITIONER

## 2023-04-10 PROCEDURE — 3075F SYST BP GE 130 - 139MM HG: CPT | Mod: CPTII,,, | Performed by: NURSE PRACTITIONER

## 2023-04-10 PROCEDURE — 3079F DIAST BP 80-89 MM HG: CPT | Mod: CPTII,,, | Performed by: NURSE PRACTITIONER

## 2023-04-10 RX ORDER — CYCLOBENZAPRINE HCL 10 MG
10 TABLET ORAL NIGHTLY PRN
Qty: 30 TABLET | Refills: 6 | Status: SHIPPED | OUTPATIENT
Start: 2023-04-10 | End: 2023-10-11

## 2023-04-10 RX ORDER — ASPIRIN 325 MG
50000 TABLET, DELAYED RELEASE (ENTERIC COATED) ORAL
Qty: 12 CAPSULE | Refills: 0 | Status: SHIPPED | OUTPATIENT
Start: 2023-04-10 | End: 2023-04-22

## 2023-04-10 NOTE — PROGRESS NOTES
Patient ID: [id number]     Chief Complaint: Annual Exam      HPI:     Meme Arreola is a 52 y.o. female here today for an annual wellness visit. No other complaints today.       Cervical Cancer Screening - Hyste  Breast Cancer Screening - Last Mammogram on 03/2023  Colon Cancer Screening - Colonoscopy 2021  Eye Exam - Last Eye Exam - 2022  Dental Exam - Recommend biannually  Vaccinations -   Immunization History   Administered Date(s) Administered    COVID-19, MRNA, LN-S, PF (MODERNA FULL 0.5 ML DOSE) 08/19/2021, 10/12/2021    Tdap 10/06/2017        Past Medical History:  has a past medical history of Cervical spondylosis, Cervicalgia, Hypertension, Hypothyroidism, unspecified, Neck pain, Neuropathy, Nicotine dependence, cigarettes, uncomplicated, Obesity, unspecified, Personal history of colonic polyps, Polyneuropathy, and Shoulder joint pain.    Surgical History:  has a past surgical history that includes Hysterectomy and Colonoscopy (07/12/2021).    Family History: family history includes Guillain-[place] syndrome in her mother.    Social History:  reports that she has been smoking cigarettes. She has been smoking an average of .05 packs per day. She has never used smokeless tobacco. She reports current alcohol use. She reports that she does not use drugs.    Current Outpatient Medications   Medication Instructions    busPIRone (BUSPAR) 10 MG tablet TAKE ONE TABLET BY MOUTH 3 TIMES A DAY    cholecalciferol (vitamin D3) 50,000 Units, Oral, Every 7 days    cyclobenzaprine (FLEXERIL) 10 mg, Oral, Nightly PRN    gabapentin (NEURONTIN) 300 mg, Oral, 3 times daily    ibuprofen (ADVIL,MOTRIN) 800 MG tablet TAKE ONE TABLET BY MOUTH 3 TIMES A DAY AS NEEDED FOR PAIN    levocetirizine (XYZAL) 5 MG tablet TAKE ONE TABLET BY MOUTH EACH EVENING.    levothyroxine (SYNTHROID) 25 MCG tablet TAKE ONE TABLET BY MOUTH DAILY    lisinopriL (PRINIVIL,ZESTRIL) 20 MG tablet TAKE 1 TABLET BY MOUTH DAILY       Patient is allergic to  "penicillin.     Patient Care Team:  JOE Montemayor as PCP - General (Nurse Practitioner)  Angeline Reyes LPN as Care Coordinator  Leodan Rowan MD as Consulting Physician (Gastroenterology)       Subjective:     Review of Systems    12 point review of systems conducted, negative except as stated in the history of present illness. See HPI for details.      Objective:     Visit Vitals  /84 (BP Location: Left arm, Patient Position: Sitting)   Pulse 80   Temp 97.9 °F (36.6 °C) (Oral)   Resp 18   Ht 5' 2.99" (1.6 m)   Wt 114.5 kg (252 lb 6.4 oz)   SpO2 98%   BMI 44.72 kg/m²       Physical Exam    General: Alert and oriented, No acute distress.  Head: Normocephalic, Atraumatic.  Eye: Pupils are equal, round and reactive to light, Extraocular movements are intact, Sclera non-icteric.  Ears/Nose/Throat: Normal, Mucosa moist,Clear.  Neck/Thyroid: Supple, Non-tender, No carotid bruit, No lymphadenopathy, No JVD, Full range of motion.  Respiratory: Clear to auscultation bilaterally; No wheezes, rales or rhonchi,Non-labored respirations, Symmetrical chest wall expansion.  Cardiovascular: Regular rate and rhythm, S1/S2 normal, No murmurs, rubs or gallops.  Gastrointestinal: Soft, Non-tender, Non-distended, Normal bowel sounds, No palpable organomegaly.  Musculoskeletal: Normal range of motion.  Integumentary: Warm, Dry, Intact, No suspicious lesions or rashes.  Extremities: No clubbing, cyanosis or edema  Neurologic: No focal deficits, Cranial Nerves II-XII are grossly intact, Motor strength normal upper and lower extremities, Sensory exam intact.  Psychiatric: Normal interaction, Coherent speech, Euthymic mood, Appropriate affect     Labs Reviewed:     Chemistry:  Lab Results   Component Value Date     03/07/2023    K 4.9 03/07/2023    CHLORIDE 103 03/07/2023    BUN 13.0 03/07/2023    CREATININE 0.69 03/07/2023    EGFRNORACEVR >60 03/07/2023    GLUCOSE 105 (H) 03/07/2023    CALCIUM 8.8 03/07/2023    " ALKPHOS 83 03/07/2023    LABPROT 7.0 03/07/2023    ALBUMIN 3.9 03/07/2023    BILIDIR 0.1 08/25/2021    IBILI 0.30 08/25/2021    AST 18 03/07/2023    ALT 31 03/07/2023    MG 2.2 06/09/2017    [id number] 16.4 (L) 03/07/2023    TSH 3.761 03/07/2023        Lab Results   Component Value Date    HGBA1C 5.0 03/07/2023        Hematology:  Lab Results   Component Value Date    WBC 6.6 03/07/2023    HGB 13.8 03/07/2023    HCT 40.9 03/07/2023     03/07/2023       Lipid Panel:  Lab Results   Component Value Date    CHOL 216 (H) 03/07/2023    HDL 52 03/07/2023    .00 03/07/2023    TRIG 138 03/07/2023    TOTALCHOLEST 4 03/07/2023        Urine:  Lab Results   Component Value Date    APPEARANCEUA Clear 06/09/2017    PHUA 5.5 06/09/2017    PROTEINUA Negative 06/09/2017    LEUKOCYTESUR Negative 06/09/2017    RBCUA None Seen 06/09/2017    BACTERIA None Seen 06/09/2017    CREATRANDUR 19.2 (L) 03/07/2023          Assessment:       ICD-10-CM ICD-9-CM   1. Well adult exam  Z00.00 V70.0   2. Hypothyroidism, unspecified type  E03.9 244.9   3. Vitamin D deficiency  E55.9 268.9        Plan:   1. Well adult exam  Healthy lifestyle discussed.  Breast cancer screening up-to-date.  Colon cancer screening up-to-date.    2. Hypothyroidism, unspecified type  Stable.  Continue current management.    3. Vitamin D deficiency  Vitamin D3 71153 IU p.o. q.week x 12 weeks into pharmacy.        Follow up in about 6 months (around 10/10/2023). In addition to their scheduled follow up, the patient has also been instructed to follow up on as needed basis.     Future Appointments   Date Time Provider Department Center   10/11/2023  8:00 AM JOE Montemayor [facility]        JOE Montemayor

## 2023-05-04 ENCOUNTER — TELEPHONE (OUTPATIENT)
Dept: NEUROSURGERY | Facility: CLINIC | Age: 53
End: 2023-05-04
Payer: MEDICAID

## 2023-05-04 NOTE — TELEPHONE ENCOUNTER
----- Message from Dorita Ron MA sent at 5/4/2023  2:01 PM CDT -----  Regarding: referral  Contact: nicole Cook   Nicole from  [facility] calling to see if office have  received a referral on patient. Please nicole Cook

## 2023-05-08 RX ORDER — LEVOTHYROXINE SODIUM 25 UG/1
TABLET ORAL
Qty: 30 TABLET | Refills: 11 | Status: SHIPPED | OUTPATIENT
Start: 2023-05-08

## 2023-07-10 PROBLEM — Z00.00 WELL ADULT EXAM: Status: RESOLVED | Noted: 2023-04-10 | Resolved: 2023-07-10

## 2023-07-13 RX ORDER — IBUPROFEN 800 MG/1
TABLET ORAL
Qty: 30 TABLET | Refills: 6 | Status: SHIPPED | OUTPATIENT
Start: 2023-07-13 | End: 2023-09-27

## 2023-07-20 DIAGNOSIS — M47.812 CERVICAL SPONDYLOSIS: Primary | ICD-10-CM

## 2023-08-04 DIAGNOSIS — Z76.0 MEDICATION REFILL: Primary | ICD-10-CM

## 2023-08-07 RX ORDER — BUSPIRONE HYDROCHLORIDE 10 MG/1
TABLET ORAL
Qty: 90 TABLET | Refills: 11 | Status: SHIPPED | OUTPATIENT
Start: 2023-08-07

## 2023-08-07 RX ORDER — LEVOCETIRIZINE DIHYDROCHLORIDE 5 MG/1
TABLET, FILM COATED ORAL
Qty: 30 TABLET | Refills: 11 | Status: SHIPPED | OUTPATIENT
Start: 2023-08-07

## 2023-08-30 RX ORDER — MUPIROCIN 20 MG/G
OINTMENT TOPICAL 3 TIMES DAILY
Qty: 22 G | Refills: 0 | Status: SHIPPED | OUTPATIENT
Start: 2023-08-30 | End: 2023-09-04

## 2023-09-18 DIAGNOSIS — M54.2 NECK PAIN: Primary | ICD-10-CM

## 2023-09-18 DIAGNOSIS — M47.812 CERVICAL SPONDYLOSIS: ICD-10-CM

## 2023-09-27 DIAGNOSIS — M47.812 CERVICAL SPONDYLOSIS: Primary | ICD-10-CM

## 2023-09-27 RX ORDER — IBUPROFEN 800 MG/1
TABLET ORAL
Qty: 30 TABLET | Refills: 6 | Status: SHIPPED | OUTPATIENT
Start: 2023-09-27 | End: 2023-12-18

## 2023-10-03 DIAGNOSIS — E03.9 HYPOTHYROIDISM, UNSPECIFIED TYPE: Primary | ICD-10-CM

## 2023-10-10 DIAGNOSIS — I10 HYPERTENSION, UNSPECIFIED TYPE: Primary | ICD-10-CM

## 2023-10-10 RX ORDER — LISINOPRIL 20 MG/1
TABLET ORAL
Qty: 30 TABLET | Refills: 11 | Status: SHIPPED | OUTPATIENT
Start: 2023-10-10

## 2023-10-11 ENCOUNTER — OFFICE VISIT (OUTPATIENT)
Dept: FAMILY MEDICINE | Facility: CLINIC | Age: 53
End: 2023-10-11
Payer: MEDICAID

## 2023-10-11 VITALS
RESPIRATION RATE: 18 BRPM | OXYGEN SATURATION: 98 % | TEMPERATURE: 98 F | BODY MASS INDEX: 46.16 KG/M2 | HEART RATE: 80 BPM | HEIGHT: 63 IN | DIASTOLIC BLOOD PRESSURE: 77 MMHG | WEIGHT: 260.5 LBS | SYSTOLIC BLOOD PRESSURE: 116 MMHG

## 2023-10-11 DIAGNOSIS — E66.9 OBESITY, UNSPECIFIED CLASSIFICATION, UNSPECIFIED OBESITY TYPE, UNSPECIFIED WHETHER SERIOUS COMORBIDITY PRESENT: ICD-10-CM

## 2023-10-11 DIAGNOSIS — M47.812 CERVICAL SPONDYLOSIS: Primary | ICD-10-CM

## 2023-10-11 DIAGNOSIS — M54.2 CERVICALGIA: ICD-10-CM

## 2023-10-11 DIAGNOSIS — I10 HYPERTENSION, UNSPECIFIED TYPE: ICD-10-CM

## 2023-10-11 PROCEDURE — 1159F MED LIST DOCD IN RCRD: CPT | Mod: CPTII,,, | Performed by: NURSE PRACTITIONER

## 2023-10-11 PROCEDURE — 3066F PR DOCUMENTATION OF TREATMENT FOR NEPHROPATHY: ICD-10-PCS | Mod: CPTII,,, | Performed by: NURSE PRACTITIONER

## 2023-10-11 PROCEDURE — 3044F HG A1C LEVEL LT 7.0%: CPT | Mod: CPTII,,, | Performed by: NURSE PRACTITIONER

## 2023-10-11 PROCEDURE — 1160F RVW MEDS BY RX/DR IN RCRD: CPT | Mod: CPTII,,, | Performed by: NURSE PRACTITIONER

## 2023-10-11 PROCEDURE — 99213 OFFICE O/P EST LOW 20 MIN: CPT | Mod: ,,, | Performed by: NURSE PRACTITIONER

## 2023-10-11 PROCEDURE — 3061F PR NEG MICROALBUMINURIA RESULT DOCUMENTED/REVIEW: ICD-10-PCS | Mod: CPTII,,, | Performed by: NURSE PRACTITIONER

## 2023-10-11 PROCEDURE — 99213 PR OFFICE/OUTPT VISIT, EST, LEVL III, 20-29 MIN: ICD-10-PCS | Mod: ,,, | Performed by: NURSE PRACTITIONER

## 2023-10-11 PROCEDURE — 3008F PR BODY MASS INDEX (BMI) DOCUMENTED: ICD-10-PCS | Mod: CPTII,,, | Performed by: NURSE PRACTITIONER

## 2023-10-11 PROCEDURE — 3078F PR MOST RECENT DIASTOLIC BLOOD PRESSURE < 80 MM HG: ICD-10-PCS | Mod: CPTII,,, | Performed by: NURSE PRACTITIONER

## 2023-10-11 PROCEDURE — 3074F SYST BP LT 130 MM HG: CPT | Mod: CPTII,,, | Performed by: NURSE PRACTITIONER

## 2023-10-11 PROCEDURE — 3078F DIAST BP <80 MM HG: CPT | Mod: CPTII,,, | Performed by: NURSE PRACTITIONER

## 2023-10-11 PROCEDURE — 1160F PR REVIEW ALL MEDS BY PRESCRIBER/CLIN PHARMACIST DOCUMENTED: ICD-10-PCS | Mod: CPTII,,, | Performed by: NURSE PRACTITIONER

## 2023-10-11 PROCEDURE — 3061F NEG MICROALBUMINURIA REV: CPT | Mod: CPTII,,, | Performed by: NURSE PRACTITIONER

## 2023-10-11 PROCEDURE — 3074F PR MOST RECENT SYSTOLIC BLOOD PRESSURE < 130 MM HG: ICD-10-PCS | Mod: CPTII,,, | Performed by: NURSE PRACTITIONER

## 2023-10-11 PROCEDURE — 3008F BODY MASS INDEX DOCD: CPT | Mod: CPTII,,, | Performed by: NURSE PRACTITIONER

## 2023-10-11 PROCEDURE — 3044F PR MOST RECENT HEMOGLOBIN A1C LEVEL <7.0%: ICD-10-PCS | Mod: CPTII,,, | Performed by: NURSE PRACTITIONER

## 2023-10-11 PROCEDURE — 1159F PR MEDICATION LIST DOCUMENTED IN MEDICAL RECORD: ICD-10-PCS | Mod: CPTII,,, | Performed by: NURSE PRACTITIONER

## 2023-10-11 PROCEDURE — 3066F NEPHROPATHY DOC TX: CPT | Mod: CPTII,,, | Performed by: NURSE PRACTITIONER

## 2023-10-11 PROCEDURE — 4010F PR ACE/ARB THEARPY RXD/TAKEN: ICD-10-PCS | Mod: CPTII,,, | Performed by: NURSE PRACTITIONER

## 2023-10-11 PROCEDURE — 4010F ACE/ARB THERAPY RXD/TAKEN: CPT | Mod: CPTII,,, | Performed by: NURSE PRACTITIONER

## 2023-10-11 RX ORDER — CYCLOBENZAPRINE HCL 10 MG
10 TABLET ORAL 3 TIMES DAILY PRN
Qty: 90 TABLET | Refills: 6 | Status: SHIPPED | OUTPATIENT
Start: 2023-10-11

## 2023-10-11 NOTE — PATIENT INSTRUCTIONS
Ricki Valentine,     If you are due for any health screening(s) below please notify me so we can arrange them to be ordered and scheduled. Most healthy patients at your age complete them, but you are free to accept or refuse.     If you can't do it, I'll definitely understand. If you can, I'd certainly appreciate it!    All of your core healthy metrics are met.      Were here to help you quit smoking     Our records indicated that you are still smoking. One of the best things you can do for your health is to stop smoking and we are here to help.     Talk with your provider about our Smoking Cessation Program and how we can support you on your journey.

## 2023-10-11 NOTE — PROGRESS NOTES
Patient ID: [id number]     Chief Complaint: Follow-up (6 mo fu )      HPI:     Meme Arreola is a 53 y.o. female here today for a follow up.  She has not heard from physical therapy to begin treatment for cervical neck pain and cervical radiculopathy.  Patient reports continued neck pain.  Pain is worse with certain positioning and relieved with rest.  Patient reports occasional shocking and tingling sensation to bilateral upper extremities.  Patient reports relief with cyclobenzaprine.      Past Medical History:  has a past medical history of Cervical spondylosis, Cervicalgia, Hypertension, Hypothyroidism, unspecified, Neck pain, Neuropathy, Nicotine dependence, cigarettes, uncomplicated, Obesity, unspecified, Personal history of colonic polyps, Polyneuropathy, and Shoulder joint pain.    Surgical History:  has a past surgical history that includes Hysterectomy and Colonoscopy (07/12/2021).    Family History: family history includes Guillain-[place] syndrome in her mother.    Social History:  reports that she has been smoking vaping with nicotine and cigarettes. She has never used smokeless tobacco. She reports current alcohol use. She reports that she does not use drugs.    Current Outpatient Medications   Medication Instructions    busPIRone (BUSPAR) 10 MG tablet TAKE ONE TABLET BY MOUTH 3 TIMES A DAY    cyclobenzaprine (FLEXERIL) 10 mg, Oral, 3 times daily PRN    gabapentin (NEURONTIN) 300 mg, Oral, 3 times daily     mg tablet TAKE ONE TABLET BY MOUTH 3 TIMES A DAY AS NEEDED FOR PAIN    levocetirizine (XYZAL) 5 MG tablet TAKE ONE TABLET BY MOUTH EACH EVENING.    levothyroxine (SYNTHROID) 25 MCG tablet TAKE ONE TABLET BY MOUTH DAILY    lisinopriL (PRINIVIL,ZESTRIL) 20 MG tablet TAKE 1 TABLET BY MOUTH DAILY       Patient is allergic to penicillin.     Patient Care Team:  Mary Fermin FNP as PCP - General (Nurse Practitioner)  Angeline Reyes LPN as Care Coordinator  Leodan Rowan MD as  "Consulting Physician (Gastroenterology)       Subjective:     Review of Systems    12 point review of systems conducted, negative except as stated in the history of present illness. See HPI for details.      Objective:     Visit Vitals  /77   Pulse 80   Temp 98.1 °F (36.7 °C) (Oral)   Resp 18   Ht 5' 2.99" (1.6 m)   Wt 118.2 kg (260 lb 8 oz)   SpO2 98%   BMI 46.16 kg/m²       Physical Exam    General: Alert and oriented, No acute distress.  Head: Normocephalic, Atraumatic.  Eye: Pupils are equal, round and reactive to light, Extraocular movements are intact, Sclera non-icteric.  Ears/Nose/Throat: Normal, Mucosa moist,Clear.  Neck/Thyroid: Supple, Non-tender, No carotid bruit, No lymphadenopathy, No JVD, Full range of motion.  Respiratory: Clear to auscultation bilaterally; No wheezes, rales or rhonchi,Non-labored respirations, Symmetrical chest wall expansion.  Cardiovascular: Regular rate and rhythm, S1/S2 normal, No murmurs, rubs or gallops.  Gastrointestinal: Soft, Non-tender, Non-distended, Normal bowel sounds, No palpable organomegaly.  Musculoskeletal: Normal range of motion.  Integumentary: Warm, Dry, Intact, No suspicious lesions or rashes.  Extremities: No clubbing, cyanosis or edema  Neurologic: No focal deficits, Cranial Nerves II-XII are grossly intact, Motor strength normal upper and lower extremities, Sensory exam intact.  Psychiatric: Normal interaction, Coherent speech, Euthymic mood, Appropriate affect     Labs Reviewed:     Chemistry:  Lab Results   Component Value Date     03/07/2023    K 4.9 03/07/2023    CHLORIDE 103 03/07/2023    BUN 13.0 03/07/2023    CREATININE 0.69 03/07/2023    EGFRNORACEVR >60 03/07/2023    GLUCOSE 105 (H) 03/07/2023    CALCIUM 8.8 03/07/2023    ALKPHOS 83 03/07/2023    LABPROT 7.0 03/07/2023    ALBUMIN 3.9 03/07/2023    BILIDIR 0.1 08/25/2021    IBILI 0.30 08/25/2021    AST 18 03/07/2023    ALT 31 03/07/2023    MG 2.2 06/09/2017    [id number] 16.4 (L) " 03/07/2023    TSH 3.761 03/07/2023        Lab Results   Component Value Date    HGBA1C 5.0 03/07/2023        Hematology:  Lab Results   Component Value Date    WBC 6.6 03/07/2023    HGB 13.8 03/07/2023    HCT 40.9 03/07/2023     03/07/2023       Lipid Panel:  Lab Results   Component Value Date    CHOL 216 (H) 03/07/2023    HDL 52 03/07/2023    .00 03/07/2023    TRIG 138 03/07/2023    TOTALCHOLEST 4 03/07/2023        Urine:  Lab Results   Component Value Date    APPEARANCEUA Clear 06/09/2017    PHUA 5.5 06/09/2017    PROTEINUA Negative 06/09/2017    LEUKOCYTESUR Negative 06/09/2017    RBCUA None Seen 06/09/2017    BACTERIA None Seen 06/09/2017    CREATRANDUR 19.2 (L) 03/07/2023          Assessment:       ICD-10-CM ICD-9-CM   1. Cervical spondylosis  M47.812 721.0   2. Hypertension, unspecified type  I10 401.9   3. Cervicalgia  M54.2 723.1   4. Obesity, unspecified classification, unspecified obesity type, unspecified whether serious comorbidity present  E66.9 278.00        Plan:     1. Hypertension, unspecified type  Well controlled.   Continue   Hypertension Medications               lisinopriL (PRINIVIL,ZESTRIL) 20 MG tablet TAKE 1 TABLET BY MOUTH DAILY        Low Sodium Diet (DASH Diet - Less than 2 grams of sodium per day).  Monitor blood pressure daily and log. Report consistent numbers greater than 140/90.  Maintain healthy weight with goal BMI <30. Exercise 30 minutes per day, 5 days per week.  Smoking cessation encouraged to aid in BP reduction.      2. Cervical spondylosis  Not at goal.  New referral sent to PT. cyclobenzaprine increased to 10 mg p.o. t.i.d. p.r.n. spasms.  - Ambulatory referral/consult to Physical/Occupational Therapy; Future    3. Cervicalgia  Not at goal.  New referral sent to PT. cyclobenzaprine increased to 10 mg p.o. t.i.d. p.r.n. spasms.  - Ambulatory referral/consult to Physical/Occupational Therapy; Future    4. Obesity, unspecified classification, unspecified obesity  type, unspecified whether serious comorbidity present  Body mass index is 46.16 kg/m².  Goal BMI <30.  Exercise 5 times a week for 30 minutes per day.  Avoid soda, simple sugars, excessive rice, potatoes or bread. Limit fast foods and fried foods.  Choose complex carbs in moderation (example: green vegetables, beans, oatmeal). Eat plenty of fresh fruits and vegetables with lean meats daily.  Do not skip meals. Eat a balanced portion size.  Avoid fad diets. Consider permanent healthy life style changes.         Follow up in about 3 months (around 1/11/2024) for HTN, Hypothyroid. In addition to their scheduled follow up, the patient has also been instructed to follow up on as needed basis.     Future Appointments   Date Time Provider Department Center   1/11/2024  8:45 AM Mary Fermin FNP [facility]        JOE Montemayor

## 2023-10-17 ENCOUNTER — CLINICAL SUPPORT (OUTPATIENT)
Dept: REHABILITATION | Facility: HOSPITAL | Age: 53
End: 2023-10-17
Attending: NURSE PRACTITIONER
Payer: MEDICAID

## 2023-10-17 DIAGNOSIS — R29.3 POSTURE IMBALANCE: ICD-10-CM

## 2023-10-17 DIAGNOSIS — R29.898 DECREASED ROM OF NECK: ICD-10-CM

## 2023-10-17 DIAGNOSIS — M54.2 CERVICALGIA: ICD-10-CM

## 2023-10-17 DIAGNOSIS — M47.812 CERVICAL SPONDYLOSIS: Primary | ICD-10-CM

## 2023-10-17 PROBLEM — M25.611 DECREASED RANGE OF MOTION OF RIGHT SHOULDER: Status: RESOLVED | Noted: 2022-05-04 | Resolved: 2023-10-17

## 2023-10-17 PROBLEM — M25.511 SHOULDER PAIN, RIGHT: Status: RESOLVED | Noted: 2022-05-04 | Resolved: 2023-10-17

## 2023-10-17 PROCEDURE — 97163 PT EVAL HIGH COMPLEX 45 MIN: CPT

## 2023-10-17 NOTE — PLAN OF CARE
[facility]   Physical Therapy Initial Evaluation      Name: Meme Arreola  Clinic Number: [id number]    Therapy Diagnosis:   Encounter Diagnoses   Name Primary?    Cervical spondylosis Yes    Cervicalgia     Decreased ROM of neck     Posture imbalance         Physician: Mary Fermin F*    Physician Orders: PT Eval and Treat   Medical Diagnosis from Referral: cervical spondylosis m47.812  Evaluation Date: 10/17/2023  Authorization Period Expiration: TBD  Plan of Care Expiration: TBD  Progress Note Due: 11/17/2023    Visit # / Visits authorized: 1/ 1   FOTO: 1/ 3 (20%)    Precautions: Standard     Time In: 1000  Time Out: 1050  Total Billable Time: 50 minutes    Subjective     Date of onset: about 2 years ago    History of current condition - Dulce reports: she began having neck pain about 2 years ago.  There was no trauma causing the pain.  She received PT in 2022 for chronic R shoulder pain.  She does not complain of shoulder pain today but is having bilateral Upper Trap pain, midline pain mid cervical to lower cervical, and also tenderness into the rhomboid area.  She cannot perform her housework without assistance as her arms begin going numb from the shoulders distally to the hands.    Falls: none    Imaging: x-rays:  Cervical vertebrae are aligned with preserved stature. Dens  and predental spaces are unremarkable.  There is no prevertebral soft  tissue prominence.  Intervertebral disc spaces are preserved. There is  some facets arthropathy at C3-C4 and C4-C5. No acute fracture or  subluxation identified.    Prior Therapy: yes for R shoulder in 2022  Social History: Dulce lives with their family  Occupation: not employed  Prior Level of Function: independent  Current Level of Function: independent in self care; minimal assist with some homemaking     Pain:  Current 7/10, worst 10/10, best 5/10   Location: bilateral neck  neck   Description: Aching, Burning, Deep, Numb,  Sharp, Shooting, and constant  Aggravating Factors: Extension, Flexing, Lifting, and housework  Easing Factors: rest    Patients goals: to reduce pain and to complete PT in order to schedule an MRI     Medical History:   Past Medical History:   Diagnosis Date    Cervical spondylosis     Cervicalgia     Hypertension     Hypothyroidism, unspecified     Neck pain     Neuropathy     Nicotine dependence, cigarettes, uncomplicated     Obesity, unspecified     Personal history of colonic polyps     Polyneuropathy     Shoulder joint pain        Surgical History:   Meme Arreola  has a past surgical history that includes Hysterectomy and Colonoscopy (07/12/2021).    Medications:   Meme has a current medication list which includes the following prescription(s): buspirone, cyclobenzaprine, gabapentin, ibu, levocetirizine, levothyroxine, and lisinopril.    Allergies:   Review of patient's allergies indicates:   Allergen Reactions    Penicillin Hives        Objective      BUE ROM and strength WNL      AROM  Comment   Flexion: 50% Midline pain   Extension: 50% Midline pain   Lat Flex R: 50% L upper trap pain   Lat Flex L: 50% R upper trap pain   Rotation R: 50% L sided pain   Rotation L: 50% R sided pain     Posture: L shoulder is elevated as compared to R (patient is L hand dominant); L glenohumeral joint is anterior to R; bilateral scapula move symmetrically.  Rhomboid area is slightly flattened;  Head is not excessively forward but patient does have mild forward head due to her body size.    Palpation: bilateral upper traps have spasms with tenderness to palpation.  Tenderness in bilateral rhomboids and C4-5 region midline also.    Intake Outcome Measure for FOTO neck Survey    Therapist reviewed FOTO scores for Meme Arreola on 10/17/2023.   FOTO report - see Media section or FOTO account episode details.    Intake Score: 20%         Treatment     Total Treatment time (time-based codes) separate from  Evaluation: 20 minutes     Dulce received the treatments listed below:      therapeutic exercises to develop strength, ROM, and posture for 20 minutes including:  Therapeutic Exercise Grid     Exercise 1  Exercise 2  Exercise 3  Exercise 4    Exercise :    Supine: head press; elbow press Scapular retraction Upper trap stretch   Rows: seated with elbows at 90; standing with elbows extended.     Repetition/Time :    5 x 5 sec each   5 x 5 sec   2 x 15 sec   10 each     Resist or Assist :                  Comment :          Attempted levator stretch but increased pain      Done :    yes  yes   yes   yes                    Exercise 5  Exercise 6  Exercise 7  Exercise 8    Exercise :                  Repetition/Time :                  Resist or Assist :                  Comment :                  Done :                                  Exercise 9  Exercise 10  Exercise 11  Exercise 12    Exercise :                  Repetition/Time :                  Resist or Assist :                  Comment :                  Done :                                 Exercise 13 Exercise 14  Exercise 15  Exercise 16   Exercise :                  Repetition/Time :                  Resist or Assist :                  Comment :                  Done :                                  Exercise 17 Exercise 18 Exercise 19 Exercise 20    Exercise :                  Repetition/Time :                  Resist or Assist :                  Comment :                  Done :                                 Patient Education and Home Exercises     Education provided:   - Patient instructed in above home program and better posture for sitting.    Written Home Exercises Provided: yes. Exercises were reviewed and Dulce was able to demonstrate them prior to the end of the session.  Dulce demonstrated good  understanding of the education provided. See EMR under Patient Instructions for exercises provided during therapy sessions.    Assessment     Meme is  a 53 y.o. female referred to outpatient Physical Therapy with a medical diagnosis of cervical spondylosis. Patient presents with postural deficits, muscle tightness, and pain.  Patient is a larger person which contributes to some of her postural issues. She will benefit from PT to reduce pain complaints and improve posture and ROM.    Patient prognosis is Good.   Patient will benefit from skilled outpatient Physical Therapy to address the deficits stated above and in the chart below, provide patient /family education, and to maximize patientt's level of independence.     Plan of care discussed with patient: Yes  Patient's spiritual, cultural and educational needs considered and patient is agreeable to the plan of care and goals as stated below:     Anticipated Barriers for therapy: pain, muscle spasms, ROM deficits, posture    Medical Necessity is demonstrated by the following  History  Co-morbidities and personal factors that may impact the plan of care [] LOW: no personal factors / co-morbidities  [] MODERATE: 1-2 personal factors / co-morbidities  [x] HIGH: 3+ personal factors / co-morbidities    Moderate / High Support Documentation:   Co-morbidities affecting plan of care: see above    Personal Factors:   no deficits     Examination  Body Structures and Functions, activity limitations and participation restrictions that may impact the plan of care [] LOW: addressing 1-2 elements  [] MODERATE: 3+ elements  [x] HIGH: 4+ elements (please support below)    Moderate / High Support Documentation: see above     Clinical Presentation [] LOW: stable  [x] MODERATE: Evolving  [] HIGH: Unstable     Decision Making/ Complexity Score: high       Goals:  Short term goals (4 weeks):  1. Patient's complaints of pain will be </=5/10 with activities.  2. Cervical ROM will increase by 25% range for improved safety when driving.  3. FOTO will increase by >/= 10 points for perception of improved functional mobility.  4. Patient will  be independent in her home exercise program for consistency of positioning throughout her day.     Long term goals (6 weeks):  1. Patient's complaints of pain will be </=3/10.  2. Cervical ROM will be >/= 80% for improved safety during daily activities.  3. Patient will be able to achieve good sitting posture with no complaints of pain at rest.  4. FOTO will increase to a score of 40% or greater for improved perception of functional mobility.  5. Muscle spasms will be resolved.       Plan     Plan of care Certification: 10/17/2023 to 12/1/2023.    Outpatient Physical Therapy 2 times weekly for 6 weeks to include the following interventions: Cervical/Lumbar Traction, Manual Therapy, Patient Education, Therapeutic Exercise, and pain control modalities as needed .     Sharmila Gallegos PT        Physician's Signature: _________________________________________ Date: ________________

## 2023-10-24 DIAGNOSIS — M47.812 CERVICAL SPONDYLOSIS: Primary | ICD-10-CM

## 2023-10-30 ENCOUNTER — CLINICAL SUPPORT (OUTPATIENT)
Dept: REHABILITATION | Facility: HOSPITAL | Age: 53
End: 2023-10-30
Attending: NURSE PRACTITIONER
Payer: MEDICAID

## 2023-10-30 DIAGNOSIS — R29.898 DECREASED ROM OF NECK: Primary | ICD-10-CM

## 2023-10-30 DIAGNOSIS — R29.3 POSTURE IMBALANCE: ICD-10-CM

## 2023-10-30 PROCEDURE — 97110 THERAPEUTIC EXERCISES: CPT

## 2023-10-30 NOTE — PROGRESS NOTES
[facility]   Physical Therapy Treatment Note      Name: Meme Arreola  Clinic Number: [id number]    Therapy Diagnosis:   Encounter Diagnoses   Name Primary?    Decreased ROM of neck Yes    Posture imbalance      Physician: Mary Fermin F*    Visit Date: 10/30/2023    Physician Orders: PT Eval and Treat   Medical Diagnosis from Referral: cervical spondylosis m47.812  Evaluation Date: 10/17/2023  Authorization Period Expiration: 12/01/23  Plan of Care Expiration: 12/01/23  Progress Note Due: 11/17/2023     Visit # / Visits authorized: 1/ 12   FOTO: 1/ 3 (20%)     Precautions: Standard      Time In: 1033  Time Out: 1102  Total Billable Time: 29 minutes       PTA Visit #: 0/5       Subjective     Patient reports: that her neck is doing a little better. Working on exercises at home since eval.  She was compliant with home exercise program.  Response to previous treatment: n/a  Functional change: n/a    Pain: 4/10  Location: right neck      Objective      Objective Measures updated at progress report unless specified.     Treatment     Dulce received the treatments listed below:      therapeutic exercises to develop strength, ROM, flexibility, and posture for 30 minutes including:  Therapeutic Exercise Grid     Exercise 1  Exercise 2  Exercise 3  Exercise 4    Exercise :    Supine: head press; elbow press Scapular retraction R -Upper trap stretch   R Levator    Rows: seated with elbows at 90; standing with elbows extended.     Repetition/Time :    10 x 5 sec each   10 x 5 sec   3 x 20 sec   2 x 10 each     Resist or Assist :             GreenTB     Comment :           Row only     Done :    yes  yes   yes   yes                      Exercise 5  Exercise 6  Exercise 7  Exercise 8    Exercise :    Face Pulls   Wall slides    STM  - R levator         Repetition/Time :    2 x 10   X 10    3 mins        Resist or Assist :    yellow              Comment :                  Done :    YES     YES    YES                          Exercise 9  Exercise 10  Exercise 11  Exercise 12    Exercise :                  Repetition/Time :                  Resist or Assist :                  Comment :                  Done :                                   Exercise 13 Exercise 14  Exercise 15  Exercise 16   Exercise :                  Repetition/Time :                  Resist or Assist :                  Comment :                  Done :                                   Exercise 17 Exercise 18 Exercise 19 Exercise 20    Exercise :                  Repetition/Time :                  Resist or Assist :                  Comment :                  Done :                                     Patient Education and Home Exercises       Education provided:   - reviewed HEP    Written Home Exercises Provided: Patient instructed to cont prior HEP. Exercises were reviewed and Dulce was able to demonstrate them prior to the end of the session.  Dulce demonstrated good  understanding of the education provided. See Electronic Medical Record under Patient Instructions for exercises provided during therapy sessions    Assessment     Dulce tolerated treatment well today completing all assigned exercises with good effort and reporting no additional discomfort during treatment session. Patient required moderate verbal cues and minimal tactile cues for proper exercise execution. Palpation revealed increased tension in R levator relieved with soft tissue massage. Noted improved R cervical rotation following exercises and stretching.       Dulce Is progressing well towards her goals.   Patient prognosis is Excellent.     Patient will continue to benefit from skilled outpatient physical therapy to address the deficits listed in the problem list box on initial evaluation, provide pt/family education and to maximize pt's level of independence in the home and community environment.     Patient's spiritual, cultural and educational needs  considered and pt agreeable to plan of care and goals.     Anticipated barriers to physical therapy: spondylosis    Goals:   Short term goals (4 weeks):  1. Patient's complaints of pain will be </=5/10 with activities.  2. Cervical ROM will increase by 25% range for improved safety when driving.  3. FOTO will increase by >/= 10 points for perception of improved functional mobility.  4. Patient will be independent in her home exercise program for consistency of positioning throughout her day.     Long term goals (6 weeks):  1. Patient's complaints of pain will be </=3/10.  2. Cervical ROM will be >/= 80% for improved safety during daily activities.  3. Patient will be able to achieve good sitting posture with no complaints of pain at rest.  4. FOTO will increase to a score of 40% or greater for improved perception of functional mobility.  5. Muscle spasms will be resolved.        Plan        Plan of care Certification: 10/17/2023 to 12/1/2023.     Outpatient Physical Therapy 2 times weekly to include the following interventions: Cervical/Lumbar Traction, Manual Therapy, Patient Education, Therapeutic Exercise, and pain control modalities as needed .     Lio Covington, PT

## 2023-10-31 NOTE — PROGRESS NOTES
[facility]   Physical Therapy Treatment Note      Name: Meme Arreola  Clinic Number: [id number]    Therapy Diagnosis:   Encounter Diagnoses   Name Primary?    Decreased ROM of neck Yes    Posture imbalance      Physician: Mary Fermin F*    Visit Date: 11/1/2023    Physician Orders: PT Eval and Treat   Medical Diagnosis from Referral: cervical spondylosis m47.812  Evaluation Date: 10/17/2023  Authorization Period Expiration: 12/01/23  Plan of Care Expiration: 12/01/23  Progress Note Due: 11/17/2023     Visit # / Visits authorized: 3/ 12   FOTO: 1/ 3 (20%)     Precautions: Standard      Time In: 1100  Time Out: 1130  Total Billable Time: 30 minutes       PTA Visit #: 1/5       Subjective     Patient reports: the neck is a little stiff today.   She was compliant with home exercise program.  Response to previous treatment: n/a  Functional change: n/a    Pain: 5/10  Location: right neck      Objective      Objective Measures updated at progress report unless specified.     Treatment     Dulce received the treatments listed below:      therapeutic exercises to develop strength, ROM, flexibility, and posture for 30 minutes including:  Therapeutic Exercise Grid     Exercise 1  Exercise 2  Exercise 3  Exercise 4    Exercise :    Supine: head press; elbow press Scapular retraction R -Upper trap stretch   R Levator    Rows: seated with elbows at 90; standing with elbows extended.     Repetition/Time :    15 x 5 sec each   15 x 5 sec   3 x 20 sec   2 x 10 each     Resist or Assist :             GreenTB     Comment :           Row only     Done :    yes  yes   yes   yes                      Exercise 5  Exercise 6  Exercise 7  Exercise 8    Exercise :    Face Pulls   Wall slides    STM  - R levator         Repetition/Time :    2 x 10   X 10    3 mins        Resist or Assist :    yellow              Comment :                  Done :    YES    YES    YES                          Exercise  9  Exercise 10  Exercise 11  Exercise 12    Exercise :                  Repetition/Time :                  Resist or Assist :                  Comment :                  Done :                                   Exercise 13 Exercise 14  Exercise 15  Exercise 16   Exercise :                  Repetition/Time :                  Resist or Assist :                  Comment :                  Done :                                   Exercise 17 Exercise 18 Exercise 19 Exercise 20    Exercise :                  Repetition/Time :                  Resist or Assist :                  Comment :                  Done :                                     Patient Education and Home Exercises       Education provided:   - reviewed HEP    Written Home Exercises Provided: Patient instructed to cont prior HEP. Exercises were reviewed and Dulce was able to demonstrate them prior to the end of the session.  Dulce demonstrated good  understanding of the education provided. See Electronic Medical Record under Patient Instructions for exercises provided during therapy sessions    Assessment     Patient completed all exercises with good effort and no reported increase in pain. Increased reps of head and elbow press, and scapular retraction to 15 with good tolerance. Dulce showed good retention of exercises forme with minimal verbal and tactile cues required for form.       Dulce Is progressing well towards her goals.   Patient prognosis is Excellent.     Patient will continue to benefit from skilled outpatient physical therapy to address the deficits listed in the problem list box on initial evaluation, provide pt/family education and to maximize pt's level of independence in the home and community environment.     Patient's spiritual, cultural and educational needs considered and pt agreeable to plan of care and goals.     Anticipated barriers to physical therapy: spondylosis    Goals:   Short term goals (4 weeks):  1. Patient's  complaints of pain will be </=5/10 with activities.  2. Cervical ROM will increase by 25% range for improved safety when driving.  3. FOTO will increase by >/= 10 points for perception of improved functional mobility.  4. Patient will be independent in her home exercise program for consistency of positioning throughout her day.     Long term goals (6 weeks):  1. Patient's complaints of pain will be </=3/10.  2. Cervical ROM will be >/= 80% for improved safety during daily activities.  3. Patient will be able to achieve good sitting posture with no complaints of pain at rest.  4. FOTO will increase to a score of 40% or greater for improved perception of functional mobility.  5. Muscle spasms will be resolved.        Plan        Plan of care Certification: 10/17/2023 to 12/1/2023.     Outpatient Physical Therapy 2 times weekly to include the following interventions: Cervical/Lumbar Traction, Manual Therapy, Patient Education, Therapeutic Exercise, and pain control modalities as needed .     Sai Mcneal, PTA

## 2023-11-01 ENCOUNTER — CLINICAL SUPPORT (OUTPATIENT)
Dept: REHABILITATION | Facility: HOSPITAL | Age: 53
End: 2023-11-01
Attending: NURSE PRACTITIONER
Payer: MEDICAID

## 2023-11-01 DIAGNOSIS — R29.898 DECREASED ROM OF NECK: Primary | ICD-10-CM

## 2023-11-01 DIAGNOSIS — R29.3 POSTURE IMBALANCE: ICD-10-CM

## 2023-11-01 PROCEDURE — 97110 THERAPEUTIC EXERCISES: CPT | Mod: CQ

## 2023-11-06 ENCOUNTER — CLINICAL SUPPORT (OUTPATIENT)
Dept: REHABILITATION | Facility: HOSPITAL | Age: 53
End: 2023-11-06
Attending: NURSE PRACTITIONER
Payer: MEDICAID

## 2023-11-06 DIAGNOSIS — R29.898 DECREASED ROM OF NECK: Primary | ICD-10-CM

## 2023-11-06 DIAGNOSIS — R29.3 POSTURE IMBALANCE: ICD-10-CM

## 2023-11-06 PROCEDURE — 97110 THERAPEUTIC EXERCISES: CPT

## 2023-11-06 NOTE — PROGRESS NOTES
[facility]   Physical Therapy Treatment Note      Name: Meme Arreola  Clinic Number: [id number]    Therapy Diagnosis:   Encounter Diagnoses   Name Primary?    Decreased ROM of neck Yes    Posture imbalance      Physician: Mary Fermin F*    Visit Date: 11/6/2023    Physician Orders: PT Eval and Treat   Medical Diagnosis from Referral: cervical spondylosis m47.812  Evaluation Date: 10/17/2023  Authorization Period Expiration: 12/01/23  Plan of Care Expiration: 12/01/23  Progress Note Due: 11/17/2023     Visit # / Visits authorized: 3/ 12   FOTO: 1/ 3 (20%)     Precautions: Standard      Time In: 1100  Time Out: 1130  Total Billable Time: 30 minutes       PTA Visit #: 1/5       Subjective     Patient reports: her pain is about the same as last visit but she can see a difference since starting therapy   She was compliant with home exercise program.  Response to previous treatment: n/a  Functional change: n/a    Pain: 5/10  Location: right neck on ibuprofen and muscle relaxants    Objective      On informal testing, cervical ROM is now 75% at worst in all planes  Posture improved from evaluation with less forward head and better alignment.    Treatment     Dulce received the treatments listed below:      therapeutic exercises to develop strength, ROM, flexibility, and posture for 30 minutes including:  Therapeutic Exercise Grid     Exercise 1  Exercise 2  Exercise 3  Exercise 4    Exercise :    Supine: head press; elbow press Scapular retraction R -Upper trap stretch   R Levator    Rows: seated with elbows at 90; standing with elbows extended.     Repetition/Time :    15 x 5 sec each   15 x 5 sec   3 x 20 sec   2 x 10 each     Resist or Assist :             GreenLITA     Comment :               Done :    yes  yes   yes   yes                      Exercise 5  Exercise 6  Exercise 7  Exercise 8    Exercise :    Face Pulls   Wall slides    STM  - R levator    T-spine extension  "sitting     Repetition/Time :    2 x 10   X 10    3 mins   10     Resist or Assist :    yellow              Comment :                  Done :    YES    YES    no    yes                      Exercise 9  Exercise 10  Exercise 11  Exercise 12    Exercise :    Corner stretches              Repetition/Time :    10 x 5 sec              Resist or Assist :                  Comment :                  Done :    yes                               Exercise 13 Exercise 14  Exercise 15  Exercise 16   Exercise :                  Repetition/Time :                  Resist or Assist :                  Comment :                  Done :                                   Exercise 17 Exercise 18 Exercise 19 Exercise 20    Exercise :                  Repetition/Time :                  Resist or Assist :                  Comment :                  Done :                                     Patient Education and Home Exercises       Education provided:   - reviewed HEP    Written Home Exercises Provided: Patient instructed to cont prior HEP. Exercises were reviewed and Dulce was able to demonstrate them prior to the end of the session.  Dulce demonstrated good  understanding of the education provided. See Electronic Medical Record under Patient Instructions for exercises provided during therapy sessions    Assessment     Patient completed all exercises with good effort and no reported increase in pain. Exercises did not exacerbate pain.  Patient identified cervical stretches as "hurts good" and she feels that these help.  Cues for proper alignment during  stretching needed.      Dulce Is progressing well towards her goals.   Patient prognosis is Excellent.     Patient will continue to benefit from skilled outpatient physical therapy to address the deficits listed in the problem list box on initial evaluation, provide pt/family education and to maximize pt's level of independence in the home and community environment.     Patient's spiritual, " cultural and educational needs considered and pt agreeable to plan of care and goals.     Anticipated barriers to physical therapy: spondylosis    Goals:   Short term goals (4 weeks):  1. Patient's complaints of pain will be </=5/10 with activities.  2. Cervical ROM will increase by 25% range for improved safety when driving.--MET  3. FOTO will increase by >/= 10 points for perception of improved functional mobility.  4. Patient will be independent in her home exercise program for consistency of positioning throughout her day.     Long term goals (6 weeks):  1. Patient's complaints of pain will be </=3/10.  2. Cervical ROM will be >/= 80% for improved safety during daily activities.  3. Patient will be able to achieve good sitting posture with no complaints of pain at rest.  4. FOTO will increase to a score of 40% or greater for improved perception of functional mobility.  5. Muscle spasms will be resolved.        Plan        Plan of care Certification: 10/17/2023 to 12/1/2023.     Outpatient Physical Therapy 2 times weekly to include the following interventions: Cervical/Lumbar Traction, Manual Therapy, Patient Education, Therapeutic Exercise, and pain control modalities as needed .     Sharmila Gallegos, PT

## 2023-11-07 NOTE — PROGRESS NOTES
[facility]   Physical Therapy Treatment Note      Name: Meme Arreola  Clinic Number: [id number]    Therapy Diagnosis:   Encounter Diagnoses   Name Primary?    Decreased ROM of neck Yes    Posture imbalance      Physician: Mary Fermin F*    Visit Date: 11/8/2023    Physician Orders: PT Eval and Treat   Medical Diagnosis from Referral: cervical spondylosis m47.812  Evaluation Date: 10/17/2023  Authorization Period Expiration: 12/01/23  Plan of Care Expiration: 12/01/23  Progress Note Due: 11/17/2023     Visit # / Visits authorized: 4/ 12   FOTO: 1/ 3 (20%)     Precautions: Standard      Time In: 1100  Time Out: 1130  Total Billable Time: 30 minutes       PTA Visit #: 1/5       Subjective     Patient reports: her pain is less today with ibuprofen and flexeril. Continues to notice small improvements.  She was compliant with home exercise program.  Response to previous treatment: n/a  Functional change: n/a    Pain: 4/10  Location: right neck on ibuprofen and muscle relaxants    Objective      N/A    Treatment     Dulce received the treatments listed below:      therapeutic exercises to develop strength, ROM, flexibility, and posture for 30 minutes including:  Therapeutic Exercise Grid     Exercise 1  Exercise 2  Exercise 3  Exercise 4    Exercise :    Supine: head press; elbow press Scapular retraction R -Upper trap stretch   R Levator    Rows: seated with elbows at 90; standing with elbows extended.     Repetition/Time :    20 x 5 sec each   15 x 5 sec   3 x 20 sec   2 x 10 each     Resist or Assist :             GreenTB     Comment :               Done :    yes  yes   yes   yes                      Exercise 5  Exercise 6  Exercise 7  Exercise 8    Exercise :    Face Pulls   Wall slides    STM  - R levator    T-spine extension sitting     Repetition/Time :    2 x 10   X 15   3 mins   10     Resist or Assist :    green             Comment :                  Done :    YES     YES    no    yes                      Exercise 9  Exercise 10  Exercise 11  Exercise 12    Exercise :    Corner stretches              Repetition/Time :    3 x 20 sec              Resist or Assist :                  Comment :                  Done :    yes                               Exercise 13 Exercise 14  Exercise 15  Exercise 16   Exercise :                  Repetition/Time :                  Resist or Assist :                  Comment :                  Done :                                   Exercise 17 Exercise 18 Exercise 19 Exercise 20    Exercise :                  Repetition/Time :                  Resist or Assist :                  Comment :                  Done :                                     Patient Education and Home Exercises       Education provided:   - reviewed HEP    Written Home Exercises Provided: Patient instructed to cont prior HEP. Exercises were reviewed and Dulce was able to demonstrate them prior to the end of the session.  Dulce demonstrated good  understanding of the education provided. See Electronic Medical Record under Patient Instructions for exercises provided during therapy sessions    Assessment     Patient completed all exercises with good effort and no reported increase in pain. Increased reps of supine exercises, and progressed to green Thera-band for face pulls with good tolerance. Tone remains in B upper traps and levator. Moderate verbal and tactile cues required for form.       Dulce Is progressing well towards her goals.   Patient prognosis is Excellent.     Patient will continue to benefit from skilled outpatient physical therapy to address the deficits listed in the problem list box on initial evaluation, provide pt/family education and to maximize pt's level of independence in the home and community environment.     Patient's spiritual, cultural and educational needs considered and pt agreeable to plan of care and goals.     Anticipated barriers to physical  therapy: spondylosis    Goals:   Short term goals (4 weeks):  1. Patient's complaints of pain will be </=5/10 with activities.  2. Cervical ROM will increase by 25% range for improved safety when driving.--MET  3. FOTO will increase by >/= 10 points for perception of improved functional mobility.  4. Patient will be independent in her home exercise program for consistency of positioning throughout her day.     Long term goals (6 weeks):  1. Patient's complaints of pain will be </=3/10.  2. Cervical ROM will be >/= 80% for improved safety during daily activities.  3. Patient will be able to achieve good sitting posture with no complaints of pain at rest.  4. FOTO will increase to a score of 40% or greater for improved perception of functional mobility.  5. Muscle spasms will be resolved.        Plan        Plan of care Certification: 10/17/2023 to 12/1/2023.     Outpatient Physical Therapy 2 times weekly to include the following interventions: Cervical/Lumbar Traction, Manual Therapy, Patient Education, Therapeutic Exercise, and pain control modalities as needed .     Sai Mcneal, PTA

## 2023-11-08 ENCOUNTER — CLINICAL SUPPORT (OUTPATIENT)
Dept: REHABILITATION | Facility: HOSPITAL | Age: 53
End: 2023-11-08
Attending: NURSE PRACTITIONER
Payer: MEDICAID

## 2023-11-08 DIAGNOSIS — R29.898 DECREASED ROM OF NECK: Primary | ICD-10-CM

## 2023-11-08 DIAGNOSIS — R29.3 POSTURE IMBALANCE: ICD-10-CM

## 2023-11-08 PROCEDURE — 97110 THERAPEUTIC EXERCISES: CPT | Mod: CQ

## 2023-11-13 ENCOUNTER — CLINICAL SUPPORT (OUTPATIENT)
Dept: REHABILITATION | Facility: HOSPITAL | Age: 53
End: 2023-11-13
Attending: NURSE PRACTITIONER
Payer: MEDICAID

## 2023-11-13 DIAGNOSIS — R29.3 POSTURE IMBALANCE: ICD-10-CM

## 2023-11-13 DIAGNOSIS — R29.898 DECREASED ROM OF NECK: Primary | ICD-10-CM

## 2023-11-13 PROCEDURE — 97110 THERAPEUTIC EXERCISES: CPT

## 2023-11-13 NOTE — PROGRESS NOTES
[facility]   Physical Therapy Treatment Note      Name: Meme Arreola  Clinic Number: [id number]    Therapy Diagnosis:   Encounter Diagnoses   Name Primary?    Decreased ROM of neck Yes    Posture imbalance        Physician: Mary Fermin F*    Visit Date: 11/13/2023    Physician Orders: PT Eval and Treat   Medical Diagnosis from Referral: cervical spondylosis m47.812  Evaluation Date: 10/17/2023  Authorization Period Expiration: 12/01/23  Plan of Care Expiration: 12/01/23  Progress Note Due: 11/17/2023     Visit # / Visits authorized: 5/ 12   FOTO: 1/ 3 (20%)     Precautions: Standard      Time In: 1050  Time Out: 1130  Total Billable Time: 40 minutes     PTA Visit #: 1/5     Subjective     Patient reports: her pain is always better after she takes muscle relaxer and Ibuprofen. Compliant with HEP outside of therapy.  She was compliant with home exercise program.  Response to previous treatment: good  Functional change: improved tolerance with exercises    Pain: 5/10 with Ibuprofen and muscle relaxer this morning  Location: bilateral neck     Objective      N/A    Treatment     Dulce received the treatments listed below:      therapeutic exercises to develop strength, ROM, flexibility, and posture for 40 minutes including:    Therapeutic Exercise Grid     Exercise 1  Exercise 2  Exercise 3  Exercise 4    Exercise :    Supine: head press; elbow press Scapular retraction B -Upper trap stretch   B- Levator    Rows: seated with elbows at 90; standing with elbows extended.     Repetition/Time :    15 x 5 sec each   15 x 5 sec   3 x 20 sec   2 x 10 each     Resist or Assist :    Head press- hor TR  Elbow press- vert TR         GreenTB     Comment :           standing    Done :    yes  no   yes   no                      Exercise 5  Exercise 6  Exercise 7  Exercise 8    Exercise :    Face Pulls   Wall slides    STM  - R levator    T-spine extension sitting     Repetition/Time :    20  x 5 secs  X 15   3 mins   10     Resist or Assist :    Green TB             Comment :                  Done :    yes  no  no    no                     Exercise 9  Exercise 10  Exercise 11  Exercise 12    Exercise :    Corner stretches   SB: TRISHA mills        Repetition/Time :    3 x 20 sec   15  15        Resist or Assist :                 Comment :                  Done :    yes   yes   yes                         Exercise 13 Exercise 14  Exercise 15  Exercise 16   Exercise :                  Repetition/Time :                  Resist or Assist :                  Comment :                  Done :                                   Exercise 17 Exercise 18 Exercise 19 Exercise 20    Exercise :                  Repetition/Time :                  Resist or Assist :                  Comment :                  Done :                                   Patient Education and Home Exercises       Education provided:   - reviewed HEP    Written Home Exercises Provided: Patient instructed to cont prior HEP. Exercises were reviewed and Dulce was able to demonstrate them prior to the end of the session.  Dulce demonstrated good  understanding of the education provided. See Electronic Medical Record under Patient Instructions for exercises provided during therapy sessions    Assessment     Pt performed all cervical and thoracic stretches and strengthening exercises well to facilitate reduction of pain and improve posture with daily activities. Additional thoracic strengthening exercises added to POC with cues required for proper technique and encouraged pt to add to HEP.     Dulce Is progressing well towards her goals.   Patient prognosis is Excellent.     Patient will continue to benefit from skilled outpatient physical therapy to address the deficits listed in the problem list box on initial evaluation, provide pt/family education and to maximize pt's level of independence in the home and community environment.      Patient's spiritual, cultural and educational needs considered and pt agreeable to plan of care and goals.     Anticipated barriers to physical therapy: spondylosis    Goals:   Short term goals (4 weeks):    1. Patient's complaints of pain will be </=5/10 with activities.  2. Cervical ROM will increase by 25% range for improved safety when driving.--MET  3. FOTO will increase by >/= 10 points for perception of improved functional mobility.  4. Patient will be independent in her home exercise program for consistency of positioning throughout her day.     Long term goals (6 weeks):    1. Patient's complaints of pain will be </=3/10.  2. Cervical ROM will be >/= 80% for improved safety during daily activities.  3. Patient will be able to achieve good sitting posture with no complaints of pain at rest.  4. FOTO will increase to a score of 40% or greater for improved perception of functional mobility.  5. Muscle spasms will be resolved.        Plan      Plan of care Certification: 10/17/2023 to 12/1/2023.     Outpatient Physical Therapy 2 times weekly to include the following interventions: Cervical/Lumbar Traction, Manual Therapy, Patient Education, Therapeutic Exercise, and pain control modalities as needed .     Mariel Erickson, PT

## 2023-11-15 ENCOUNTER — CLINICAL SUPPORT (OUTPATIENT)
Dept: REHABILITATION | Facility: HOSPITAL | Age: 53
End: 2023-11-15
Attending: NURSE PRACTITIONER
Payer: MEDICAID

## 2023-11-15 DIAGNOSIS — R29.898 DECREASED ROM OF NECK: Primary | ICD-10-CM

## 2023-11-15 DIAGNOSIS — R29.3 POSTURE IMBALANCE: ICD-10-CM

## 2023-11-15 PROCEDURE — 97110 THERAPEUTIC EXERCISES: CPT

## 2023-11-15 NOTE — PLAN OF CARE
[facility]  Physical Therapy Plan of Care Note     Name: Meme Arreola  Clinic Number: [id number]    Therapy Diagnosis:   Encounter Diagnoses   Name Primary?    Decreased ROM of neck Yes    Posture imbalance      Physician: Mary Fermin F*    Visit Date: 11/15/2023    Physician Orders: PT Eval and Treat   Medical Diagnosis from Referral: cervical spondylosis m47.812  Evaluation Date: 10/17/2023  Authorization Period Expiration: 12/01/23  Plan of Care Expiration: 12/01/23  Progress Note completed: 11/15/2023  NEXT DUE: TBD     Visit # / Visits authorized: 6 / 12  FOTO: 2/ 3 (20%)     Precautions: Standard      Time In: 1025  Time Out: 1100  Total Billable Time: 35 minutes    Precautions: Standard  Functional Level Prior to Evaluation:  IND    Subjective     Update: patient reports that she is doing better. Main problem seems to be finding a comfortable position at night. Unable to sleep more than a couple of hours due to discomfort.     Objective      Update:     BUE ROM and strength WNL        AROM   Comment   Flexion: 80% Midline pain   Extension: 100% Midline pain   Lat Flex R: 100% L upper trap pain   Lat Flex L: 100% R upper trap pain   Rotation R: 80% L sided pain   Rotation L: 1000% R sided pain      Posture: Shoulders are equal. Right scapula is depressed. Mild forward shoulders bilaterally      Palpation: Increased spasm in Right upper traps and Right cervical paraspinals.     Dulce received the treatments listed below:       therapeutic exercises to develop strength, ROM, flexibility, and posture for 35 minutes including:     Therapeutic Exercise Grid     Exercise 1  Exercise 2  Exercise 3  Exercise 4    Exercise :    Supine: head press; elbow press Scapular retraction B -Upper trap stretch   B- Levator    Rows: seated with elbows at 90; standing with elbows extended.     Repetition/Time :    2 x 10   2 x 10   3 x 20 sec   2 x 10 each     Resist or Assist :     5  sec each  5 sec      GreenTB     Comment :            standing    Done :    yes    yes                         Exercise 5  Exercise 6  Exercise 7  Exercise 8    Exercise :    Face Pulls   Wall slides    STM  - R levator    T-spine extension sitting     Repetition/Time :    20 x 5 secs  X 15   3 mins   10     Resist or Assist :    Green TB             Comment :                  Done :                           Exercise 9  Exercise 10  Exercise 11  Exercise 12    Exercise :    Corner stretches   SB: YTA   Yovany wings   STM     Repetition/Time :    3 x 20 sec   15  15   8 mins     Resist or Assist :                 Comment :             Right Upper traps/ bilateral cervical paraspinals     Done :       YES                       Exercise 13 Exercise 14  Exercise 15  Exercise 16   Exercise :    Suboccipital release              Repetition/Time :    2 mins              Resist or Assist :                  Comment :                  Done :    YES                                Exercise 17 Exercise 18 Exercise 19 Exercise 20    Exercise :                  Repetition/Time :                  Resist or Assist :                  Comment :                  Done :                                   Assessment     Update: Dulce has shown good progress with therapy completing 6 total visits. Patient has shown improvement in cervical range of motion all planes. Posture has improved with equal shoulder height bilaterally and decreased forward shoulders. Improvement noted in cervical and upper back musculature. Dulce tolerated treatment well today completing all assigned exercises with good effort and reporting no additional discomfort during treatment session. Patient required moderate verbal cues and minimal tactile cues for proper exercise execution.      Previous Short Term Goals Status:   4 of 4 MET    Long Term Goal Status: continue per initial plan of care.  Reasons for Recertification of Therapy:   Deficits remain in cervical  pain level, decreased R cervical rotation , increased R upper trap/cervical muscle spinal muscle spasms. These deficits prevent the patient from meeting long term goals and PLOF    GOALS  Goals:   Short term goals (4 weeks):     1. Patient's complaints of pain will be </=5/10 with activities. MET  2. Cervical ROM will increase by 25% range for improved safety when driving.--MET  3. FOTO will increase by >/= 10 points for perception of improved functional mobility. - MET  4. Patient will be independent in her home exercise program for consistency of positioning throughout her day.- MET     Long term goals (6 weeks):     1. Patient's complaints of pain will be </=3/10.  2. Cervical ROM will be >/= 80% for improved safety during daily activities.  3. Patient will be able to achieve good sitting posture with no complaints of pain at rest.  4. FOTO will increase to a score of 40% or greater for improved perception of functional mobility.  5. Muscle spasms will be resolved.        Plan     Updated Certification Period: 12/02/23 to D   Recommended Treatment Plan: 2 times per week for 2 weeks:  Manual Therapy, Patient Education, Therapeutic Activities, and Therapeutic Exercise  Other Recommendations:     Lio Covington, PT

## 2023-11-20 ENCOUNTER — CLINICAL SUPPORT (OUTPATIENT)
Dept: REHABILITATION | Facility: HOSPITAL | Age: 53
End: 2023-11-20
Attending: NURSE PRACTITIONER
Payer: MEDICAID

## 2023-11-20 DIAGNOSIS — R29.898 DECREASED ROM OF NECK: Primary | ICD-10-CM

## 2023-11-20 DIAGNOSIS — R29.3 POSTURE IMBALANCE: ICD-10-CM

## 2023-11-20 PROCEDURE — 97110 THERAPEUTIC EXERCISES: CPT | Mod: CQ

## 2023-11-20 NOTE — PROGRESS NOTES
[facility]   Physical Therapy Treatment Note      Name: Meme Arreola  Clinic Number: [id number]    Therapy Diagnosis:   Encounter Diagnoses   Name Primary?    Decreased ROM of neck Yes    Posture imbalance        Physician: Mary Fermin F*    Visit Date: 11/20/2023    Physician Orders: PT Eval and Treat   Medical Diagnosis from Referral: cervical spondylosis m47.812  Evaluation Date: 10/17/2023  Authorization Period Expiration: 12/01/23  Plan of Care Expiration: 12/01/23  Progress Note Due: 11/17/2023     Visit # / Visits authorized: 5/ 12   FOTO: 1/ 3 (20%)     Precautions: Standard      Time In: 1100  Time Out: 1130  Total Billable Time: 30 minutes     PTA Visit #: 1/5     Subjective     Patient reports: she has no pain today.  She was compliant with home exercise program.  Response to previous treatment: good  Functional change: improved tolerance with exercises    Pain: 0/10 with Ibuprofen and muscle relaxer this morning  Location: bilateral neck     Objective      N/A    Treatment     Dulce received the treatments listed below:      therapeutic exercises to develop strength, ROM, flexibility, and posture for 30 minutes including:    Therapeutic Exercise Grid     Exercise 1  Exercise 2  Exercise 3  Exercise 4    Exercise :    Supine: head press; elbow press Scapular retraction B -Upper trap stretch   B- Levator    Rows: seated with elbows at 90; standing with elbows extended.     Repetition/Time :    15 x 5 sec each   15 x 5 sec   3 x 20 sec   2 x 10 each     Resist or Assist :    Head press- hor TR  Elbow press- vert TR         GreenTB     Comment :           standing    Done :    yes  no   yes   no                      Exercise 5  Exercise 6  Exercise 7  Exercise 8    Exercise :    Face Pulls   Wall slides    STM  - R levator    T-spine extension sitting     Repetition/Time :    20 x 5 secs  X 15   3 mins   10     Resist or Assist :    Green TB             Comment :                   Done :    yes  no  no    no                     Exercise 9  Exercise 10  Exercise 11  Exercise 12    Exercise :    Corner stretches   SB: TRISHA mills        Repetition/Time :    3 x 20 sec   15  15        Resist or Assist :                 Comment :                  Done :    yes   yes   yes                         Exercise 13 Exercise 14  Exercise 15  Exercise 16   Exercise :                  Repetition/Time :                  Resist or Assist :                  Comment :                  Done :                                   Exercise 17 Exercise 18 Exercise 19 Exercise 20    Exercise :                  Repetition/Time :                  Resist or Assist :                  Comment :                  Done :                                   Patient Education and Home Exercises       Education provided:   - reviewed HEP    Written Home Exercises Provided: Patient instructed to cont prior HEP. Exercises were reviewed and Dulce was able to demonstrate them prior to the end of the session.  Dulce demonstrated good  understanding of the education provided. See Electronic Medical Record under Patient Instructions for exercises provided during therapy sessions    Assessment     Patient completed all exercises with good effort. Dulce continued with exercises added last session, and resumed ROM and strengthening exercises with good tolerance. Moderate verbal and tactile cues required for form.     Dulce Is progressing well towards her goals.   Patient prognosis is Excellent.     Patient will continue to benefit from skilled outpatient physical therapy to address the deficits listed in the problem list box on initial evaluation, provide pt/family education and to maximize pt's level of independence in the home and community environment.     Patient's spiritual, cultural and educational needs considered and pt agreeable to plan of care and goals.     Anticipated barriers to physical therapy:  spondylosis    Goals:   Short term goals (4 weeks):    1. Patient's complaints of pain will be </=5/10 with activities.  2. Cervical ROM will increase by 25% range for improved safety when driving.--MET  3. FOTO will increase by >/= 10 points for perception of improved functional mobility.  4. Patient will be independent in her home exercise program for consistency of positioning throughout her day.     Long term goals (6 weeks):    1. Patient's complaints of pain will be </=3/10.  2. Cervical ROM will be >/= 80% for improved safety during daily activities.  3. Patient will be able to achieve good sitting posture with no complaints of pain at rest.  4. FOTO will increase to a score of 40% or greater for improved perception of functional mobility.  5. Muscle spasms will be resolved.        Plan      Plan of care Certification: 10/17/2023 to 12/1/2023.     Outpatient Physical Therapy 2 times weekly to include the following interventions: Cervical/Lumbar Traction, Manual Therapy, Patient Education, Therapeutic Exercise, and pain control modalities as needed .     Sai Mcneal, PTA

## 2023-11-27 ENCOUNTER — CLINICAL SUPPORT (OUTPATIENT)
Dept: REHABILITATION | Facility: HOSPITAL | Age: 53
End: 2023-11-27
Attending: NURSE PRACTITIONER
Payer: MEDICAID

## 2023-11-27 DIAGNOSIS — R29.898 DECREASED ROM OF NECK: Primary | ICD-10-CM

## 2023-11-27 DIAGNOSIS — R29.3 POSTURE IMBALANCE: ICD-10-CM

## 2023-11-27 PROCEDURE — 97110 THERAPEUTIC EXERCISES: CPT | Mod: CQ

## 2023-11-27 NOTE — PROGRESS NOTES
[facility]   Physical Therapy Treatment Note      Name: Meme Arreola  Clinic Number: [id number]    Therapy Diagnosis:   Encounter Diagnoses   Name Primary?    Decreased ROM of neck Yes    Posture imbalance        Physician: Mary Fermin F*    Visit Date: 11/27/2023    Physician Orders: PT Eval and Treat   Medical Diagnosis from Referral: cervical spondylosis m47.812  Evaluation Date: 10/17/2023  Authorization Period Expiration: 12/01/23  Plan of Care Expiration: 12/01/23  Progress Note Due: 11/17/2023     Visit # / Visits authorized: 8/ 12   FOTO: 1/ 3 (20%)     Precautions: Standard      Time In: 1100  Time Out: 1135  Total Billable Time: 35 minutes     PTA Visit #: 2/5     Subjective     Patient reports: she has some increased pain today that she believes is because of the cold weather.   She was compliant with home exercise program.  Response to previous treatment: good  Functional change: improved tolerance with exercises    Pain: 4-5/10 with Ibuprofen and muscle relaxer this morning  Location: bilateral neck     Objective      N/A    Treatment     Dulce received the treatments listed below:      therapeutic exercises to develop strength, ROM, flexibility, and posture for 35 minutes including:    Therapeutic Exercise Grid     Exercise 1  Exercise 2  Exercise 3  Exercise 4    Exercise :    Supine: head press; elbow press Scapular retraction B -Upper trap stretch   B- Levator    Rows: seated with elbows at 90; standing with elbows extended.     Repetition/Time :    15 x 5 sec each   15 x 5 sec   3 x 20 sec   2 x 10 each     Resist or Assist :    Head press- hor TR  Elbow press- vert TR         GreenTB     Comment :           standing    Done :    yes  no   yes   no                      Exercise 5  Exercise 6  Exercise 7  Exercise 8    Exercise :    Face Pulls   Wall slides    STM  - R levator    T-spine extension sitting     Repetition/Time :    20 x 5 secs  X 15   3 mins    10     Resist or Assist :    Green TB             Comment :                  Done :    yes  no  no    no                     Exercise 9  Exercise 10  Exercise 11  Exercise 12    Exercise :    Corner stretches   SB: YTA   Yovany wings   Cervical range of motion with TR      Repetition/Time :    3 x 20 sec   15  15   10 eac     Resist or Assist :                 Comment :             All planes     Done :    yes   yes   yes   yes                      Exercise 13 Exercise 14  Exercise 15  Exercise 16   Exercise :    Manual cervical traction              Repetition/Time :                  Resist or Assist :                  Comment :                  Done :    yes                               Exercise 17 Exercise 18 Exercise 19 Exercise 20    Exercise :                  Repetition/Time :                  Resist or Assist :                  Comment :                  Done :                                   Patient Education and Home Exercises       Education provided:   - reviewed HEP    Written Home Exercises Provided: Patient instructed to cont prior HEP. Exercises were reviewed and Dulce was able to demonstrate them prior to the end of the session.  Dulce demonstrated good  understanding of the education provided. See Electronic Medical Record under Patient Instructions for exercises provided during therapy sessions    Assessment     Patient completed all exercises with good effort. Dulce shows good retention of new exercises, and tolerated the addition of cervical range of motion and manual cervical traction well. Tone noted in B upper traps.     Dulce Is progressing well towards her goals.   Patient prognosis is Excellent.     Patient will continue to benefit from skilled outpatient physical therapy to address the deficits listed in the problem list box on initial evaluation, provide pt/family education and to maximize pt's level of independence in the home and community environment.     Patient's spiritual,  cultural and educational needs considered and pt agreeable to plan of care and goals.     Anticipated barriers to physical therapy: spondylosis    Goals:   Short term goals (4 weeks):    1. Patient's complaints of pain will be </=5/10 with activities.  2. Cervical ROM will increase by 25% range for improved safety when driving.--MET  3. FOTO will increase by >/= 10 points for perception of improved functional mobility.  4. Patient will be independent in her home exercise program for consistency of positioning throughout her day.     Long term goals (6 weeks):    1. Patient's complaints of pain will be </=3/10.  2. Cervical ROM will be >/= 80% for improved safety during daily activities.  3. Patient will be able to achieve good sitting posture with no complaints of pain at rest.  4. FOTO will increase to a score of 40% or greater for improved perception of functional mobility.  5. Muscle spasms will be resolved.        Plan      Plan of care Certification: 10/17/2023 to 12/1/2023.     Outpatient Physical Therapy 2 times weekly to include the following interventions: Cervical/Lumbar Traction, Manual Therapy, Patient Education, Therapeutic Exercise, and pain control modalities as needed .     Sai Mcneal, PTA

## 2023-11-29 ENCOUNTER — CLINICAL SUPPORT (OUTPATIENT)
Dept: REHABILITATION | Facility: HOSPITAL | Age: 53
End: 2023-11-29
Attending: NURSE PRACTITIONER
Payer: MEDICAID

## 2023-11-29 DIAGNOSIS — M47.812 CS (CERVICAL SPONDYLOSIS): Primary | ICD-10-CM

## 2023-11-29 DIAGNOSIS — R29.3 POSTURE IMBALANCE: ICD-10-CM

## 2023-11-29 DIAGNOSIS — R29.898 DECREASED ROM OF NECK: Primary | ICD-10-CM

## 2023-11-29 NOTE — PROGRESS NOTES
[facility]   Physical Therapy Treatment Note      Name: Meme Arreola  Clinic Number: [id number]    Therapy Diagnosis:   Encounter Diagnoses   Name Primary?    Decreased ROM of neck Yes    Posture imbalance        Physician: Mary Fermin F*    Visit Date: 11/29/2023    Physician Orders: PT Eval and Treat   Medical Diagnosis from Referral: cervical spondylosis m47.812  Evaluation Date: 10/17/2023  Authorization Period Expiration: 12/01/23  Plan of Care Expiration: 12/01/23  Progress Note Due: 11/17/2023     Visit # / Visits authorized: 9/ 12   FOTO: 1/ 3 (20%)     Precautions: Standard      Time In: 1100  Time Out: 1130  Total Billable Time: 30 minutes     PTA Visit #: 2/5     Subjective     Patient reports: that pain in her neck is like a toothache and caused tightness in upper back.. Pain was increased when she tried to go outside and pick pecans.   She was compliant with home exercise program.  Response to previous treatment: good  Functional change: improved cervical mobility    Pain: 3/10 with Ibuprofen and muscle relaxer this morning  Location: bilateral neck     Objective      Cervical AROM :   Flex: 100%  Ext: 85%  R rotation: 85%  L rotation : 85%    Treatment     Dulce received the treatments listed below:      therapeutic exercises to develop strength, ROM, flexibility, and posture for 35 minutes including:    Therapeutic Exercise Grid     Exercise 1  Exercise 2  Exercise 3  Exercise 4    Exercise :    Supine: head press; elbow press Scapular retraction B -Upper trap stretch   B- Levator    Rows: seated with elbows at 90; standing with elbows extended.     Repetition/Time :    20 x 5 sec each   15 x 5 sec   3 x 20 sec   2 x 10 each     Resist or Assist :            Vaughn     Comment :    standing       standing    Done :    yes  no   yes   no                      Exercise 5  Exercise 6  Exercise 7  Exercise 8    Exercise :    Face Pulls   Wall slides    STM  - R  levator    T-spine extension sitting     Repetition/Time :    20 x 5 secs  X 15   3 mins   10     Resist or Assist :    Green TB        5 sec     Comment :                  Done :    N   no  no    YES                     Exercise 9  Exercise 10  Exercise 11  Exercise 12    Exercise :    Corner stretches   SB: YTA   Yovany wings   Cervical range of motion with TR      Repetition/Time :    3 x 20 sec   15  15   10 eac     Resist or Assist :                 Comment :          Goalpost    All planes     Done :    yes   N   yes   N                      Exercise 13 Exercise 14  Exercise 15  Exercise 16   Exercise :    Manual cervical traction   Manual Therapy   Horiz ABD/.  Diagonals        Repetition/Time :       X1    2 x 10        Resist or Assist :          Blue        Comment :       NAGS- normal apophyseal glide           Done :    N   YES    YES             C and T spine             Exercise 17 Exercise 18 Exercise 19 Exercise 20    Exercise :                  Repetition/Time :                  Resist or Assist :                  Comment :                  Done :                                   Patient Education and Home Exercises       Education provided:   - reviewed HEP    Written Home Exercises Provided: Patient instructed to cont prior HEP. Exercises were reviewed and Dulce was able to demonstrate them prior to the end of the session.  Dulce demonstrated good  understanding of the education provided. See Electronic Medical Record under Patient Instructions for exercises provided during therapy sessions    Assessment     Dulce  tolerated treatment well today completing all assigned exercises with good effort and reporting no additional discomfort during treatment session. Patient reported that her upper back felt loose and decreased muscle tightness.  Patient required moderate verbal cues and minimal tactile cues for proper exercise execution. Noted decreased UE external rotation with addressed during wall  exercises/stretches. Increased resistance for theraband exercises completed to address strength in Upper back.      Dulce Is progressing well towards her goals.   Patient prognosis is Excellent.     Patient will continue to benefit from skilled outpatient physical therapy to address the deficits listed in the problem list box on initial evaluation, provide pt/family education and to maximize pt's level of independence in the home and community environment.     Patient's spiritual, cultural and educational needs considered and pt agreeable to plan of care and goals.     Anticipated barriers to physical therapy: spondylosis    Goals:   Short term goals (4 weeks):    1. Patient's complaints of pain will be </=5/10 with activities.  2. Cervical ROM will increase by 25% range for improved safety when driving.--MET  3. FOTO will increase by >/= 10 points for perception of improved functional mobility.  4. Patient will be independent in her home exercise program for consistency of positioning throughout her day.     Long term goals (6 weeks):    1. Patient's complaints of pain will be </=3/10.  2. Cervical ROM will be >/= 80% for improved safety during daily activities.  3. Patient will be able to achieve good sitting posture with no complaints of pain at rest.  4. FOTO will increase to a score of 40% or greater for improved perception of functional mobility.  5. Muscle spasms will be resolved.        Plan      Plan of care Certification: 10/17/2023 to 12/1/2023.     Outpatient Physical Therapy 2 times weekly to include the following interventions: Cervical/Lumbar Traction, Manual Therapy, Patient Education, Therapeutic Exercise, and pain control modalities as needed .     Lio Covington, PT

## 2023-12-04 ENCOUNTER — CLINICAL SUPPORT (OUTPATIENT)
Dept: REHABILITATION | Facility: HOSPITAL | Age: 53
End: 2023-12-04
Attending: NURSE PRACTITIONER
Payer: MEDICAID

## 2023-12-04 DIAGNOSIS — R29.898 DECREASED ROM OF NECK: Primary | ICD-10-CM

## 2023-12-04 DIAGNOSIS — R29.3 POSTURE IMBALANCE: ICD-10-CM

## 2023-12-04 PROCEDURE — 97110 THERAPEUTIC EXERCISES: CPT

## 2023-12-04 NOTE — PROGRESS NOTES
[facility]   Physical Therapy Treatment Note      Name: Meme Arreola  Clinic Number: [id number]    Therapy Diagnosis:   Encounter Diagnoses   Name Primary?    Decreased ROM of neck Yes    Posture imbalance        Physician: Mary Fermin F*    Visit Date: 12/4/2023    Physician Orders: PT Eval and Treat   Medical Diagnosis from Referral: cervical spondylosis m47.812  Evaluation Date: 10/17/2023  Authorization Period Expiration: 12/01/23  Plan of Care Expiration: 12/15/23  Progress Note Due: 11/17/2023     Visit # / Visits authorized: 1/4  FOTO: 1/ 3 (20%)     Precautions: Standard      Time In: 1030  Time Out: 1104  Total Billable Time: 34 minutes     PTA Visit #: 2/5     Subjective     Patient reports: that main issue is tightness in her neck. This happens mainly in the morning when she wakes up and later in the day after completing household chores.     She was compliant with home exercise program.  Response to previous treatment: good  Functional change: improved upper back strength    Pain: 3/10 with Ibuprofen and muscle relaxer this morning  Location: bilateral neck     Objective      N/A  Treatment     Dulce received the treatments listed below:      therapeutic exercises to develop strength, ROM, flexibility, and posture for 35 minutes including:    Therapeutic Exercise Grid     Exercise 1  Exercise 2  Exercise 3  Exercise 4    Exercise :     head press; elbow press Scapular retraction B -Upper trap stretch   B- Levator    Rows: seated with elbows at 90; standing with elbows extended.     Repetition/Time :    20 x 5 sec each   15 x 5 sec   3 x 20 sec   2 x 10 each     Resist or Assist :            GreenLITA     Comment :    standing       standing    Done :    yes  no                            Exercise 5  Exercise 6  Exercise 7  Exercise 8    Exercise :    Face Pulls   Wall slides    STM      T-spine extension sitting     Repetition/Time :    20 x 5 secs  X 15   8 mins    10     Resist or Assist :    Green TB        5 sec     Comment :          Neck and upper back         Done :      YES                          Exercise 9  Exercise 10  Exercise 11  Exercise 12    Exercise :    Corner stretches   SB: YTA   Yovany wings   Cervical range of motion with TR      Repetition/Time :    3 x 20 sec   2 x 5  rounds 15   10 eac     Resist or Assist :       Red TB          Comment :       Stand with TB   Goalpost    All planes     Done :    yes   YES                         Exercise 13 Exercise 14  Exercise 15  Exercise 16   Exercise :    Manual cervical traction   Manual Therapy   Horiz ABD/.  Diagonals        Repetition/Time :       X1    2 x 10        Resist or Assist :          Blue        Comment :       NAGS- normal apophyseal glide           Done :      YES                 C and T spine             Exercise 17 Exercise 18 Exercise 19 Exercise 20    Exercise :                  Repetition/Time :                  Resist or Assist :                  Comment :                  Done :                                   Patient Education and Home Exercises       Education provided:   - reviewed HEP    Written Home Exercises Provided: Patient instructed to cont prior HEP. Exercises were reviewed and Dulce was able to demonstrate them prior to the end of the session.  Dulce demonstrated good  understanding of the education provided. See Electronic Medical Record under Patient Instructions for exercises provided during therapy sessions    Assessment     Dulce tolerated treatment well today completing all assigned exercises with good effort and reporting no additional discomfort during treatment session. Patient required minimal verbal cues and minimal tactile cues for proper exercise execution.  Noted increased muscle tension left upper traps and Right thoracic paraspinals. Addressed with soft tissue mobilization and was able to reduce muscle tension. Good movement in t-spine following mobilization  of facets joints.       Dulce Is progressing well towards her goals.   Patient prognosis is Excellent.     Patient will continue to benefit from skilled outpatient physical therapy to address the deficits listed in the problem list box on initial evaluation, provide pt/family education and to maximize pt's level of independence in the home and community environment.     Patient's spiritual, cultural and educational needs considered and pt agreeable to plan of care and goals.     Anticipated barriers to physical therapy: spondylosis    Goals:   Short term goals (4 weeks):    1. Patient's complaints of pain will be </=5/10 with activities. MET  2. Cervical ROM will increase by 25% range for improved safety when driving.--MET  3. FOTO will increase by >/= 10 points for perception of improved functional mobility-MET  4. Patient will be independent in her home exercise program for consistency of positioning throughout her day.- MET     Long term goals (6 weeks):    1. Patient's complaints of pain will be </=3/10. IN PROGRESS   2. Cervical ROM will be >/= 80% for improved safety during daily activities.- IN PROGRSS   3. Patient will be able to achieve good sitting posture with no complaints of pain at rest. IN PROGRESS  4. FOTO will increase to a score of 40% or greater for improved perception of functional mobility. MET  5. Muscle spasms will be resolved. - IN PROGRSS        Plan      Plan of care Certification: 12/04/2023 to 12/15/2023.     Outpatient Physical Therapy 2 times weekly to include the following interventions: Cervical/Lumbar Traction, Manual Therapy, Patient Education, Therapeutic Exercise, and pain control modalities as needed .     Lio Covington, PT

## 2023-12-07 ENCOUNTER — CLINICAL SUPPORT (OUTPATIENT)
Dept: REHABILITATION | Facility: HOSPITAL | Age: 53
End: 2023-12-07
Attending: NURSE PRACTITIONER
Payer: MEDICAID

## 2023-12-07 DIAGNOSIS — R29.898 DECREASED ROM OF NECK: Primary | ICD-10-CM

## 2023-12-07 DIAGNOSIS — R29.3 POSTURE IMBALANCE: ICD-10-CM

## 2023-12-07 PROCEDURE — 97110 THERAPEUTIC EXERCISES: CPT

## 2023-12-07 NOTE — PROGRESS NOTES
[facility]   Physical Therapy Treatment Note      Name: Meme Arreola  Clinic Number: [id number]    Therapy Diagnosis:   Encounter Diagnoses   Name Primary?    Decreased ROM of neck Yes    Posture imbalance        Physician: Mary Fermin F*    Visit Date: 12/7/2023    Physician Orders: PT Eval and Treat   Medical Diagnosis from Referral: cervical spondylosis m47.812  Evaluation Date: 10/17/2023  Authorization Period Expiration: 12/01/23  Plan of Care Expiration: 12/15/23  Progress Note Due: 11/17/2023     Visit # / Visits authorized: 2/4  FOTO: 2 / 3 (20%)     Precautions: Standard      Time In: 0930  Time Out: 1001  Total Billable Time: 31 minutes     PTA Visit #: 2/5     Subjective     Patient reports: that she is doing well. Pain has stabilized at level 3.     She was compliant with home exercise program.  Response to previous treatment: good  Functional change: improved cervical RANGE OF MOTION     Pain: 3/10 with Ibuprofen and muscle relaxer this morning  Location: bilateral neck     Objective      Palpation : Increased muscle tension Right Upper trap    Treatment     Dulce received the treatments listed below:      therapeutic exercises to develop strength, ROM, flexibility, and posture for 35 minutes including:    Therapeutic Exercise Grid     Exercise 1  Exercise 2  Exercise 3  Exercise 4    Exercise :     head press; elbow press Scapular retraction  Post Shld shrugs B -Upper trap stretch   B- Levator    Rows: seated with elbows at 90; standing with elbows extended.     Repetition/Time :    20 x 5 sec each   20 x 5 sec   3 x 20 sec   2 x 10 each     Resist or Assist :            GreenTB     Comment :    standing      R Upper trap only  Seated    Done :     YES  YES    YES                       Exercise 5  Exercise 6  Exercise 7  Exercise 8    Exercise :    Face Pulls   Wall slides    STM      T-spine extension sitting     Repetition/Time :    20 x 5 secs  X 15   5  mins   10     Resist or Assist :    Green TB        5 sec     Comment :          R upper traps        Done :      YES                          Exercise 9  Exercise 10  Exercise 11  Exercise 12    Exercise :    Corner stretches   SB: YTA   Yovany wings   R cervical rotation stretch     Repetition/Time :    3 x 20 sec   2 x 5  rounds 15   3 x 10 sec     Resist or Assist :       Red TB          Comment :       Stand with TB   Goalpost         Done :       YES     YES                     Exercise 13 Exercise 14  Exercise 15  Exercise 16   Exercise :    Manual cervical traction   Manual Therapy   Horiz ABD/.  Diagonals        Repetition/Time :       X1    2 x 10        Resist or Assist :          Blue        Comment :       R 1st Rib            Done :      Yes  YES                          Exercise 17 Exercise 18 Exercise 19 Exercise 20    Exercise :                  Repetition/Time :                  Resist or Assist :                  Comment :                  Done :                                   Patient Education and Home Exercises       Education provided:   - reviewed HEP    Written Home Exercises Provided: Patient instructed to cont prior HEP. Exercises were reviewed and Dulce was able to demonstrate them prior to the end of the session.  Dulce demonstrated good  understanding of the education provided. See Electronic Medical Record under Patient Instructions for exercises provided during therapy sessions    Assessment     Dulce tolerated treatment well today completing all assigned exercises with good effort and reporting no additional discomfort during treatment session. Patient required minimal verbal cues and minimal tactile cues for proper exercise execution. Increased muscle tension noted in R upper traps resolved with soft tissue mobs and trigger point therapy. Patient demonstrated improved Upper back strength and muscle stability.         Dulce Is progressing well towards her goals.   Patient prognosis  is Excellent.     Patient will continue to benefit from skilled outpatient physical therapy to address the deficits listed in the problem list box on initial evaluation, provide pt/family education and to maximize pt's level of independence in the home and community environment.     Patient's spiritual, cultural and educational needs considered and pt agreeable to plan of care and goals.     Anticipated barriers to physical therapy: spondylosis    Goals:   Short term goals (4 weeks):    1. Patient's complaints of pain will be </=5/10 with activities. MET  2. Cervical ROM will increase by 25% range for improved safety when driving.--MET  3. FOTO will increase by >/= 10 points for perception of improved functional mobility-MET  4. Patient will be independent in her home exercise program for consistency of positioning throughout her day.- MET     Long term goals (6 weeks):    1. Patient's complaints of pain will be </=3/10. MET  2. Cervical ROM will be >/= 80% for improved safety during daily activities.- MET  3. Patient will be able to achieve good sitting posture with no complaints of pain at rest. IN PROGRESS  4. FOTO will increase to a score of 40% or greater for improved perception of functional mobility. MET  5. Muscle spasms will be resolved. - IN PROGRSS        Plan      Plan of care Certification: 12/04/2023 to 12/15/2023.     Outpatient Physical Therapy 2 times weekly to include the following interventions: Cervical/Lumbar Traction, Manual Therapy, Patient Education, Therapeutic Exercise, and pain control modalities as needed .     Lio Covington, PT

## 2023-12-11 ENCOUNTER — CLINICAL SUPPORT (OUTPATIENT)
Dept: REHABILITATION | Facility: HOSPITAL | Age: 53
End: 2023-12-11
Attending: NURSE PRACTITIONER
Payer: MEDICAID

## 2023-12-11 DIAGNOSIS — R29.3 POSTURE IMBALANCE: ICD-10-CM

## 2023-12-11 DIAGNOSIS — R29.898 DECREASED ROM OF NECK: Primary | ICD-10-CM

## 2023-12-11 PROCEDURE — 97110 THERAPEUTIC EXERCISES: CPT

## 2023-12-11 NOTE — PROGRESS NOTES
[facility]   Physical Therapy Treatment Note      Name: Meme Arreola  Clinic Number: [id number]    Therapy Diagnosis:   Encounter Diagnoses   Name Primary?    Decreased ROM of neck Yes    Posture imbalance        Physician: Mary Fermin F*    Visit Date: 12/11/2023    Physician Orders: PT Eval and Treat   Medical Diagnosis from Referral: cervical spondylosis m47.812  Evaluation Date: 10/17/2023  Authorization Period Expiration: 12/01/23  Plan of Care Expiration: 12/15/23  Progress Note Due: 11/17/2023     Visit # / Visits authorized: 3/4  FOTO: 2 / 3 (20%)     Precautions: Standard      Time In: 1030  Time Out: 1105  Total Billable Time: 35 minutes     PTA Visit #: 2/5     Subjective     Patient reports: neck pain is no longer waking her up at night. She continues to take Ibuprofen and Muscle relaxer as needed which does provide relief  She was compliant with home exercise program.  Response to previous treatment: good  Functional change: improved cervical RANGE OF MOTION     Pain: 3/10 with Ibuprofen and muscle relaxer, 5/10 prior to medication  Location: bilateral neck     Objective      N/a    Treatment     Dulce received the treatments listed below:      therapeutic exercises to develop strength, ROM, flexibility, and posture for 35 minutes including:    Therapeutic Exercise Grid     Exercise 1  Exercise 2  Exercise 3  Exercise 4    Exercise :     head press; elbow press Scapular retraction  Post Shld shrugs B -Upper trap stretch   B- Levator    TB: Rows, extension   Repetition/Time :    20 x 5 sec each   20 x 5 sec   3 x 20 sec   20 each     Resist or Assist :    Head press- Hor TR  Elbow press- vert TR        Blue TB     Comment :    supine     Upper trap only    Done :    yes no  YES    YES                       Exercise 5  Exercise 6  Exercise 7  Exercise 8    Exercise :    TB: Face Pulls   Wall slides    STM      T-spine extension sitting     Repetition/Time :     20 x 5 secs  X 15   5 mins   10     Resist or Assist :    blue TB        5 sec     Comment :          B upper traps, T- paraspinal muscles      Done :    yes  YES                          Exercise 9  Exercise 10  Exercise 11  Exercise 12    Exercise :    Corner stretches   SB: YTA   Yovany wings   R cervical rotation stretch     Repetition/Time :    3 x 20 sec   2 x 5  rounds 15   3 x 10 sec     Resist or Assist :       Red TB          Comment :       Stand with TB   Goalpost         Done :    yes   YES    no no                     Exercise 13 Exercise 14  Exercise 15  Exercise 16   Exercise :    Manual cervical traction   Manual Therapy   Horiz ABD/.  Diagonals   Mobs: C and T spine    Repetition/Time :       X1    20    3 min    Resist or Assist :          Blue TB       Comment :       R 1st Rib     in supine with TR        Done :    no  no  YES      yes                    Exercise 17 Exercise 18 Exercise 19 Exercise 20    Exercise :                  Repetition/Time :                  Resist or Assist :                  Comment :                  Done :                                   Patient Education and Home Exercises       Education provided:   - reviewed HEP    Written Home Exercises Provided: Patient instructed to cont prior HEP. Exercises were reviewed and Dulce was able to demonstrate them prior to the end of the session.  Dulce demonstrated good  understanding of the education provided. See Electronic Medical Record under Patient Instructions for exercises provided during therapy sessions    Assessment     Discussed upcoming end of authorized visits, planned discharge from PT with HEP, and importance of continuing with HEP daily. Less cues required for proper technique with exercises. Reduction of pain with cervical and thoracic mobs and STM.    Dulce Is progressing well towards her goals.   Patient prognosis is Excellent.     Patient will continue to benefit from skilled outpatient physical therapy to  address the deficits listed in the problem list box on initial evaluation, provide pt/family education and to maximize pt's level of independence in the home and community environment.     Patient's spiritual, cultural and educational needs considered and pt agreeable to plan of care and goals.     Anticipated barriers to physical therapy: spondylosis    Goals:   Short term goals (4 weeks):    1. Patient's complaints of pain will be </=5/10 with activities. MET  2. Cervical ROM will increase by 25% range for improved safety when driving.--MET  3. FOTO will increase by >/= 10 points for perception of improved functional mobility-MET  4. Patient will be independent in her home exercise program for consistency of positioning throughout her day.- MET     Long term goals (6 weeks):    1. Patient's complaints of pain will be </=3/10. MET  2. Cervical ROM will be >/= 80% for improved safety during daily activities.- MET  3. Patient will be able to achieve good sitting posture with no complaints of pain at rest. IN PROGRESS  4. FOTO will increase to a score of 40% or greater for improved perception of functional mobility. MET  5. Muscle spasms will be resolved. - IN PROGRSS      Plan      Plan of care Certification: 12/04/2023 to 12/15/2023.     Outpatient Physical Therapy 2 times weekly to include the following interventions: Cervical/Lumbar Traction, Manual Therapy, Patient Education, Therapeutic Exercise, and pain control modalities as needed .     Mariel Erickson, PT

## 2023-12-13 ENCOUNTER — CLINICAL SUPPORT (OUTPATIENT)
Dept: REHABILITATION | Facility: HOSPITAL | Age: 53
End: 2023-12-13
Attending: NURSE PRACTITIONER
Payer: MEDICAID

## 2023-12-13 DIAGNOSIS — R29.3 POSTURE IMBALANCE: ICD-10-CM

## 2023-12-13 DIAGNOSIS — R29.898 DECREASED ROM OF NECK: Primary | ICD-10-CM

## 2023-12-13 PROCEDURE — 97110 THERAPEUTIC EXERCISES: CPT

## 2023-12-13 NOTE — PLAN OF CARE
[facility]  PT Discharge Note    Name: Meme Arreola  Clinic Number: [id number]    Therapy Diagnosis:   Encounter Diagnoses   Name Primary?    Decreased ROM of neck Yes    Posture imbalance      Physician: Mary Fermin F*    Physician Orders: PT Eval and Treat   Medical Diagnosis from Referral: cervical spondylosis m47.812  Evaluation Date: 10/17/2023  Authorization Period Expiration: 12/01/23  Plan of Care Expiration: 12/01/23  Progress Note completed: 11/15/2023  NEXT DUE: TBD     Visit # / Visits authorized:4/4  FOTO: 3/ 3      Precautions: Standard      Time In: 1052  Time Out: 1125  Total Billable Time: 33 minutes     Precautions: Standard  Functional Level Prior to Evaluation:  IND    Date of Last visit: 12/13/23  Total Visits Received: 14      Subjective      Patient reports that she has been doing well. Occasionally wakes up in the morning with spasms in upper traps that subside after moving around. Exercises daily .        Objective      Posture:  unremarkable     Palpation: unremarkable     BUE ROM and strength WNL        AROM   Comment   Flexion: 100%    Extension: 100%    Lat Flex R: 100%    Lat Flex L: 100%    Rotation R: 100% Pull    Rotation L: 100% Pull      STRENGTH :     Bilateral Upper Extremity : WFL    : Right = #40 Left = #45        Dulce received the treatments listed below:       therapeutic exercises to develop strength, ROM, flexibility, and posture for 35 minutes including:     Therapeutic Exercise Grid     Exercise 1  Exercise 2  Exercise 3  Exercise 4    Exercise :    Supine: head press; elbow press Scapular retraction B -Upper trap stretch   B- Levator    Rows: seated with elbows at 90; standing with elbows extended.     Repetition/Time :    2 x 10   2 x 10   3 x 20 sec   2 x 10 each     Resist or Assist :     5 sec each  5 sec      GreenTB     Comment :            standing    Done :    yes  YES   yes                         Exercise 5   Exercise 6  Exercise 7  Exercise 8    Exercise :    Face Pulls   Wall slides    STM  - R levator    T-spine extension sitting     Repetition/Time :    20 x 5 secs  X 15   3 mins   10     Resist or Assist :    Green TB             Comment :                  Done :                               Exercise 9  Exercise 10  Exercise 11  Exercise 12    Exercise :    Corner stretches   SB: YTA   Yovany wings   STM     Repetition/Time :    3 x 20 sec   15  15   8 mins     Resist or Assist :                 Comment :             Right Upper traps/ bilateral cervical paraspinals     Done :                                 Exercise 13 Exercise 14  Exercise 15  Exercise 16   Exercise :    Suboccipital release              Repetition/Time :    2 mins              Resist or Assist :                  Comment :                  Done :                                     ASSESSMENT      Dulce  tolerated treatment well today completing all assigned exercises with good effort and reporting no additional discomfort during treatment session. Patient required minimal verbal cues and no tactile cues for proper exercise execution. Dulce has progressed well with therapy demonstrated by improvements in pain level, cervical RANGE OF MOTION , and muscle flexibility. FOTO has improved to 72.       Discharge reason: Patient has met all of his/her goals    Discharge FOTO Score: 72      Goals:   Short term goals (4 weeks):     1. Patient's complaints of pain will be </=5/10 with activities. MET  2. Cervical ROM will increase by 25% range for improved safety when driving.--MET  3. FOTO will increase by >/= 10 points for perception of improved functional mobility. MET  4. Patient will be independent in her home exercise program for consistency of positioning throughout her day.MET     Long term goals (6 weeks):     1. Patient's complaints of pain will be </=3/10.MET  2. Cervical ROM will be >/= 80% for improved safety during daily activities.MET  3.  Patient will be able to achieve good sitting posture with no complaints of pain at rest.MET  4. FOTO will increase to a score of 40% or greater for improved perception of functional mobility.MET  5. Muscle spasms will be resolved. MET       PLAN   This patient is discharged from Physical Therapy with HEP and Instructions to call with any questions.       Lio Covington, PT

## 2023-12-18 ENCOUNTER — TELEPHONE (OUTPATIENT)
Dept: FAMILY MEDICINE | Facility: CLINIC | Age: 53
End: 2023-12-18
Payer: MEDICAID

## 2023-12-18 DIAGNOSIS — M47.812 CERVICAL SPONDYLOSIS: ICD-10-CM

## 2023-12-18 RX ORDER — IBUPROFEN 800 MG/1
TABLET ORAL
Qty: 30 TABLET | Refills: 6 | Status: SHIPPED | OUTPATIENT
Start: 2023-12-18 | End: 2024-03-18

## 2023-12-18 NOTE — TELEPHONE ENCOUNTER
----- Message from Roger Troy sent at 12/18/2023 10:51 AM CST -----  .Type:  Needs Medical Advice    Who Called: Meme  Symptoms (please be specific):    How long has patient had these symptoms:    Pharmacy name and phone #:    Would the patient rather a call back or a response via [facility]?   Best Call Back Number: [phone]  Additional Information: Patient requested a call back from the nurse

## 2023-12-27 DIAGNOSIS — M54.2 CERVICALGIA: Primary | ICD-10-CM

## 2024-01-04 ENCOUNTER — HOSPITAL ENCOUNTER (OUTPATIENT)
Dept: RADIOLOGY | Facility: HOSPITAL | Age: 54
Discharge: HOME | End: 2024-01-04
Attending: NURSE PRACTITIONER
Payer: MEDICAID

## 2024-01-04 DIAGNOSIS — M54.2 CERVICALGIA: ICD-10-CM

## 2024-01-04 PROCEDURE — 72141 MRI NECK SPINE W/O DYE: CPT | Mod: TC

## 2024-01-11 ENCOUNTER — OFFICE VISIT (OUTPATIENT)
Dept: FAMILY MEDICINE | Facility: CLINIC | Age: 54
End: 2024-01-11
Payer: MEDICAID

## 2024-01-11 ENCOUNTER — TELEPHONE (OUTPATIENT)
Dept: FAMILY MEDICINE | Facility: CLINIC | Age: 54
End: 2024-01-11

## 2024-01-11 ENCOUNTER — LAB VISIT (OUTPATIENT)
Dept: LAB | Facility: HOSPITAL | Age: 54
End: 2024-01-11
Attending: NURSE PRACTITIONER
Payer: MEDICAID

## 2024-01-11 VITALS
DIASTOLIC BLOOD PRESSURE: 84 MMHG | RESPIRATION RATE: 18 BRPM | SYSTOLIC BLOOD PRESSURE: 117 MMHG | HEART RATE: 83 BPM | WEIGHT: 266.63 LBS | HEIGHT: 63 IN | BODY MASS INDEX: 47.24 KG/M2 | OXYGEN SATURATION: 99 % | TEMPERATURE: 98 F

## 2024-01-11 DIAGNOSIS — I10 HYPERTENSION, UNSPECIFIED TYPE: Primary | ICD-10-CM

## 2024-01-11 DIAGNOSIS — E03.9 HYPOTHYROIDISM, UNSPECIFIED TYPE: ICD-10-CM

## 2024-01-11 DIAGNOSIS — Z00.00 WELL ADULT EXAM: ICD-10-CM

## 2024-01-11 LAB — TSH SERPL-ACNC: 2.87 UIU/ML (ref 0.35–4.94)

## 2024-01-11 PROCEDURE — 1160F RVW MEDS BY RX/DR IN RCRD: CPT | Mod: CPTII,,, | Performed by: NURSE PRACTITIONER

## 2024-01-11 PROCEDURE — 3079F DIAST BP 80-89 MM HG: CPT | Mod: CPTII,,, | Performed by: NURSE PRACTITIONER

## 2024-01-11 PROCEDURE — 1159F MED LIST DOCD IN RCRD: CPT | Mod: CPTII,,, | Performed by: NURSE PRACTITIONER

## 2024-01-11 PROCEDURE — 99213 OFFICE O/P EST LOW 20 MIN: CPT | Mod: ,,, | Performed by: NURSE PRACTITIONER

## 2024-01-11 PROCEDURE — 36415 COLL VENOUS BLD VENIPUNCTURE: CPT

## 2024-01-11 PROCEDURE — 3074F SYST BP LT 130 MM HG: CPT | Mod: CPTII,,, | Performed by: NURSE PRACTITIONER

## 2024-01-11 PROCEDURE — 3008F BODY MASS INDEX DOCD: CPT | Mod: CPTII,,, | Performed by: NURSE PRACTITIONER

## 2024-01-11 PROCEDURE — 4010F ACE/ARB THERAPY RXD/TAKEN: CPT | Mod: CPTII,,, | Performed by: NURSE PRACTITIONER

## 2024-01-11 PROCEDURE — 84443 ASSAY THYROID STIM HORMONE: CPT

## 2024-01-11 RX ORDER — BRIMONIDINE TARTRATE AND TIMOLOL MALEATE 2; 5 MG/ML; MG/ML
1 SOLUTION OPHTHALMIC 2 TIMES DAILY
Qty: 15 ML | Status: SHIPPED | OUTPATIENT
Start: 2024-01-11 | End: 2024-01-11

## 2024-01-11 RX ORDER — BRIMONIDINE TARTRATE AND TIMOLOL MALEATE 2; 5 MG/ML; MG/ML
1 SOLUTION OPHTHALMIC 2 TIMES DAILY
Qty: 15 ML | Status: SHIPPED | OUTPATIENT
Start: 2024-01-11 | End: 2025-01-10

## 2024-01-11 NOTE — TELEPHONE ENCOUNTER
Pt made aware. UV.    ----- Message from JOE Montemayor sent at 1/11/2024 10:11 AM CST -----  TSH normal. No change in treatment at this time.

## 2024-01-11 NOTE — PROGRESS NOTES
Patient ID: [id number]     Chief Complaint: Hypertension (3 mo fu) and Hypothyroidism      HPI:     Meme Arreola is a 53 y.o. female here today for a follow up. No other complaints today.       Past Medical History:  has a past medical history of Cervical spondylosis, Cervicalgia, Hypertension, Hypothyroidism, unspecified, Neck pain, Neuropathy, Nicotine dependence, cigarettes, uncomplicated, Obesity, unspecified, Personal history of colonic polyps, Polyneuropathy, and Shoulder joint pain.    Surgical History:  has a past surgical history that includes Hysterectomy and Colonoscopy (07/12/2021).    Family History: family history includes Guillain-[place] syndrome in her mother.    Social History:  reports that she has been smoking vaping with nicotine and cigarettes. She has never used smokeless tobacco. She reports current alcohol use. She reports that she does not use drugs.    Current Outpatient Medications   Medication Instructions    brimonidine-timoloL (COMBIGAN) 0.2-0.5 % Drop 1 drop, Both Eyes, 2 times daily    busPIRone (BUSPAR) 10 MG tablet TAKE ONE TABLET BY MOUTH 3 TIMES A DAY    cyclobenzaprine (FLEXERIL) 10 mg, Oral, 3 times daily PRN    gabapentin (NEURONTIN) 300 mg, Oral, 3 times daily     mg tablet TAKE ONE TABLET BY MOUTH 3 TIMES A DAY AS NEEDED FOR PAIN    levocetirizine (XYZAL) 5 MG tablet TAKE ONE TABLET BY MOUTH EACH EVENING.    levothyroxine (SYNTHROID) 25 MCG tablet TAKE ONE TABLET BY MOUTH DAILY    lisinopriL (PRINIVIL,ZESTRIL) 20 MG tablet TAKE 1 TABLET BY MOUTH DAILY       Patient is allergic to penicillin.     Patient Care Team:  Mary Fermin FNP as PCP - General (Nurse Practitioner)  Angeline Reyes LPN as Care Coordinator  Leodan Rowan MD as Consulting Physician (Gastroenterology)       Subjective:     Review of Systems    12 point review of systems conducted, negative except as stated in the history of present illness. See HPI for details.      Objective:  "    Visit Vitals  /84 (BP Location: Left arm, Patient Position: Sitting)   Pulse 83   Temp 97.8 °F (36.6 °C) (Oral)   Resp 18   Ht 5' 2.99" (1.6 m)   Wt 120.9 kg (266 lb 9.6 oz)   SpO2 99%   BMI 47.24 kg/m²       Physical Exam    General: Alert and oriented, No acute distress.  Head: Normocephalic, Atraumatic.  Eye: Pupils are equal, round and reactive to light, Extraocular movements are intact, Sclera non-icteric.  Ears/Nose/Throat: Normal, Mucosa moist,Clear.  Neck/Thyroid: Supple, Non-tender, No carotid bruit, No lymphadenopathy, No JVD, Full range of motion.  Respiratory: Clear to auscultation bilaterally; No wheezes, rales or rhonchi,Non-labored respirations, Symmetrical chest wall expansion.  Cardiovascular: Regular rate and rhythm, S1/S2 normal, No murmurs, rubs or gallops.  Gastrointestinal: Soft, Non-tender, Non-distended, Normal bowel sounds, No palpable organomegaly.  Musculoskeletal: Normal range of motion.  Integumentary: Warm, Dry, Intact, No suspicious lesions or rashes.  Extremities: No clubbing, cyanosis or edema  Neurologic: No focal deficits, Cranial Nerves II-XII are grossly intact, Motor strength normal upper and lower extremities, Sensory exam intact.  Psychiatric: Normal interaction, Coherent speech, Euthymic mood, Appropriate affect     Labs Reviewed:     Chemistry:  Lab Results   Component Value Date     03/07/2023    K 4.9 03/07/2023    CHLORIDE 103 03/07/2023    BUN 13.0 03/07/2023    CREATININE 0.69 03/07/2023    EGFRNORACEVR >60 03/07/2023    GLUCOSE 105 (H) 03/07/2023    CALCIUM 8.8 03/07/2023    ALKPHOS 83 03/07/2023    LABPROT 7.0 03/07/2023    ALBUMIN 3.9 03/07/2023    BILIDIR 0.1 08/25/2021    IBILI 0.30 08/25/2021    AST 18 03/07/2023    ALT 31 03/07/2023    MG 2.2 06/09/2017    OTGGSVGB78AR 16.4 (L) 03/07/2023    TSH 2.865 01/11/2024        Lab Results   Component Value Date    HGBA1C 5.0 03/07/2023        Hematology:  Lab Results   Component Value Date    WBC 6.6 " 03/07/2023    HGB 13.8 03/07/2023    HCT 40.9 03/07/2023     03/07/2023       Lipid Panel:  Lab Results   Component Value Date    CHOL 216 (H) 03/07/2023    HDL 52 03/07/2023    .00 03/07/2023    TRIG 138 03/07/2023    TOTALCHOLEST 4 03/07/2023        Urine:  Lab Results   Component Value Date    APPEARANCEUA Clear 06/09/2017    PHUA 5.5 06/09/2017    PROTEINUA Negative 06/09/2017    LEUKOCYTESUR Negative 06/09/2017    RBCUA None Seen 06/09/2017    BACTERIA None Seen 06/09/2017    CREATRANDUR 19.2 (L) 03/07/2023          Assessment:       ICD-10-CM ICD-9-CM   1. Hypertension, unspecified type  I10 401.9   2. Hypothyroidism, unspecified type  E03.9 244.9   3. Well adult exam  Z00.00 V70.0        Plan:   1. Hypertension, unspecified type  Well controlled.   Continue   Hypertension Medications               lisinopriL (PRINIVIL,ZESTRIL) 20 MG tablet TAKE 1 TABLET BY MOUTH DAILY        Low Sodium Diet (DASH Diet - Less than 2 grams of sodium per day).  Monitor blood pressure daily and log. Report consistent numbers greater than 140/90.  Maintain healthy weight with goal BMI <30. Exercise 30 minutes per day, 5 days per week.  Smoking cessation encouraged to aid in BP reduction.    2. Hypothyroidism, unspecified type  Lab Results   Component Value Date    TSH 2.865 01/11/2024      Continue Levothyroxine 25 mcg p.o. daily.  Take medicine on an empty stomach with water (no other medications or beverages). Wait 30 minutes to eat or drink.  Report any symptoms of thinning hair, breaking nails, fatigue, weight gain or loss, palpitations.       3. Well adult exam  - CBC Auto Differential; Future  - Comprehensive Metabolic Panel; Future  - Lipid Panel; Future  - TSH; Future  - Hemoglobin A1C; Future  - Urinalysis; Future  - Microalbumin/Creatinine Ratio, Urine; Future  - Vitamin D; Future  - T4, Free; Future  - Urinalysis         Follow up in about 3 months (around 4/11/2024) for Wellness. In addition to their  scheduled follow up, the patient has also been instructed to follow up on as needed basis.     Future Appointments   Date Time Provider Department Center   4/12/2024 10:15 AM Mary Fermin FNP [facility]        JOE Montemayor

## 2024-01-22 ENCOUNTER — DOCUMENTATION ONLY (OUTPATIENT)
Dept: FAMILY MEDICINE | Facility: CLINIC | Age: 54
End: 2024-01-22

## 2024-01-22 LAB — CRC RECOMMENDATION EXT: NORMAL

## 2024-03-11 DIAGNOSIS — M47.812 CERVICAL SPONDYLOSIS: Primary | ICD-10-CM

## 2024-03-11 RX ORDER — GABAPENTIN 300 MG/1
CAPSULE ORAL
Qty: 90 CAPSULE | Refills: 11 | Status: SHIPPED | OUTPATIENT
Start: 2024-03-11

## 2024-03-18 DIAGNOSIS — M47.812 CERVICAL SPONDYLOSIS: ICD-10-CM

## 2024-03-18 RX ORDER — IBUPROFEN 800 MG/1
TABLET ORAL
Qty: 30 TABLET | Refills: 6 | Status: SHIPPED | OUTPATIENT
Start: 2024-03-18 | End: 2024-06-06

## 2024-03-19 ENCOUNTER — LAB VISIT (OUTPATIENT)
Dept: LAB | Facility: HOSPITAL | Age: 54
End: 2024-03-19
Attending: NURSE PRACTITIONER
Payer: MEDICAID

## 2024-03-19 DIAGNOSIS — Z00.00 WELL ADULT EXAM: ICD-10-CM

## 2024-03-19 LAB
ALBUMIN SERPL-MCNC: 3.6 G/DL (ref 3.5–5)
ALBUMIN/GLOB SERPL: 1.1 RATIO (ref 1.1–2)
ALP SERPL-CCNC: 78 UNIT/L (ref 40–150)
ALT SERPL-CCNC: 55 UNIT/L (ref 0–55)
AST SERPL-CCNC: 31 UNIT/L (ref 5–34)
BASOPHILS # BLD AUTO: 0.02 X10(3)/MCL
BASOPHILS NFR BLD AUTO: 0.5 %
BILIRUB SERPL-MCNC: 0.4 MG/DL
BUN SERPL-MCNC: 11 MG/DL (ref 9.8–20.1)
CALCIUM SERPL-MCNC: 9.6 MG/DL (ref 8.4–10.2)
CHLORIDE SERPL-SCNC: 107 MMOL/L (ref 98–107)
CHOLEST SERPL-MCNC: 193 MG/DL
CHOLEST/HDLC SERPL: 4 {RATIO} (ref 0–5)
CO2 SERPL-SCNC: 25 MMOL/L (ref 22–29)
CREAT SERPL-MCNC: 0.74 MG/DL (ref 0.55–1.02)
CREAT UR-MCNC: 71.8 MG/DL (ref 45–106)
DEPRECATED CALCIDIOL+CALCIFEROL SERPL-MC: 21.5 NG/ML (ref 30–80)
EOSINOPHIL # BLD AUTO: 0.07 X10(3)/MCL (ref 0–0.9)
EOSINOPHIL NFR BLD AUTO: 1.8 %
ERYTHROCYTE [DISTWIDTH] IN BLOOD BY AUTOMATED COUNT: 12.3 % (ref 11.5–17)
EST. AVERAGE GLUCOSE BLD GHB EST-MCNC: 99.7 MG/DL
GFR SERPLBLD CREATININE-BSD FMLA CKD-EPI: >60 MLS/MIN/1.73/M2
GLOBULIN SER-MCNC: 3.4 GM/DL (ref 2.4–3.5)
GLUCOSE SERPL-MCNC: 107 MG/DL (ref 74–100)
HBA1C MFR BLD: 5.1 %
HCT VFR BLD AUTO: 40.7 % (ref 37–47)
HDLC SERPL-MCNC: 46 MG/DL (ref 35–60)
HGB BLD-MCNC: 13.5 G/DL (ref 12–16)
IMM GRANULOCYTES # BLD AUTO: 0.01 X10(3)/MCL (ref 0–0.04)
IMM GRANULOCYTES NFR BLD AUTO: 0.3 %
LDLC SERPL CALC-MCNC: 129 MG/DL (ref 50–140)
LYMPHOCYTES # BLD AUTO: 1.5 X10(3)/MCL (ref 0.6–4.6)
LYMPHOCYTES NFR BLD AUTO: 38.2 %
MCH RBC QN AUTO: 31.9 PG (ref 27–31)
MCHC RBC AUTO-ENTMCNC: 33.2 G/DL (ref 33–36)
MCV RBC AUTO: 96.2 FL (ref 80–94)
MICROALBUMIN UR-MCNC: <5 UG/ML
MICROALBUMIN/CREAT RATIO PNL UR: NORMAL
MONOCYTES # BLD AUTO: 0.25 X10(3)/MCL (ref 0.1–1.3)
MONOCYTES NFR BLD AUTO: 6.4 %
NEUTROPHILS # BLD AUTO: 2.08 X10(3)/MCL (ref 2.1–9.2)
NEUTROPHILS NFR BLD AUTO: 52.8 %
PLATELET # BLD AUTO: 161 X10(3)/MCL (ref 130–400)
PMV BLD AUTO: 9.9 FL (ref 7.4–10.4)
POTASSIUM SERPL-SCNC: 4.5 MMOL/L (ref 3.5–5.1)
PROT SERPL-MCNC: 7 GM/DL (ref 6.4–8.3)
RBC # BLD AUTO: 4.23 X10(6)/MCL (ref 4.2–5.4)
SODIUM SERPL-SCNC: 141 MMOL/L (ref 136–145)
T4 FREE SERPL-MCNC: 0.81 NG/DL (ref 0.7–1.48)
TRIGL SERPL-MCNC: 92 MG/DL (ref 37–140)
TSH SERPL-ACNC: 2.45 UIU/ML (ref 0.35–4.94)
VLDLC SERPL CALC-MCNC: 18 MG/DL
WBC # SPEC AUTO: 3.93 X10(3)/MCL (ref 4.5–11.5)

## 2024-03-19 PROCEDURE — 80061 LIPID PANEL: CPT

## 2024-03-19 PROCEDURE — 85025 COMPLETE CBC W/AUTO DIFF WBC: CPT

## 2024-03-19 PROCEDURE — 84439 ASSAY OF FREE THYROXINE: CPT

## 2024-03-19 PROCEDURE — 82043 UR ALBUMIN QUANTITATIVE: CPT

## 2024-03-19 PROCEDURE — 84443 ASSAY THYROID STIM HORMONE: CPT

## 2024-03-19 PROCEDURE — 36415 COLL VENOUS BLD VENIPUNCTURE: CPT

## 2024-03-19 PROCEDURE — 83036 HEMOGLOBIN GLYCOSYLATED A1C: CPT

## 2024-03-19 PROCEDURE — 82306 VITAMIN D 25 HYDROXY: CPT

## 2024-03-19 PROCEDURE — 80053 COMPREHEN METABOLIC PANEL: CPT

## 2024-04-05 DIAGNOSIS — M62.838 MUSCLE SPASM: Primary | ICD-10-CM

## 2024-04-05 RX ORDER — CYCLOBENZAPRINE HCL 10 MG
TABLET ORAL
Qty: 90 TABLET | Refills: 6 | Status: SHIPPED | OUTPATIENT
Start: 2024-04-05

## 2024-04-12 ENCOUNTER — OFFICE VISIT (OUTPATIENT)
Dept: FAMILY MEDICINE | Facility: CLINIC | Age: 54
End: 2024-04-12
Payer: MEDICAID

## 2024-04-12 VITALS
RESPIRATION RATE: 18 BRPM | OXYGEN SATURATION: 98 % | WEIGHT: 270 LBS | DIASTOLIC BLOOD PRESSURE: 82 MMHG | BODY MASS INDEX: 47.84 KG/M2 | HEART RATE: 85 BPM | TEMPERATURE: 98 F | SYSTOLIC BLOOD PRESSURE: 137 MMHG | HEIGHT: 63 IN

## 2024-04-12 DIAGNOSIS — Z12.31 BREAST CANCER SCREENING BY MAMMOGRAM: ICD-10-CM

## 2024-04-12 DIAGNOSIS — Z00.00 WELLNESS EXAMINATION: Primary | ICD-10-CM

## 2024-04-12 PROCEDURE — 3044F HG A1C LEVEL LT 7.0%: CPT | Mod: CPTII,,, | Performed by: NURSE PRACTITIONER

## 2024-04-12 PROCEDURE — 3066F NEPHROPATHY DOC TX: CPT | Mod: CPTII,,, | Performed by: NURSE PRACTITIONER

## 2024-04-12 PROCEDURE — 3008F BODY MASS INDEX DOCD: CPT | Mod: CPTII,,, | Performed by: NURSE PRACTITIONER

## 2024-04-12 PROCEDURE — 99396 PREV VISIT EST AGE 40-64: CPT | Mod: ,,, | Performed by: NURSE PRACTITIONER

## 2024-04-12 PROCEDURE — 4010F ACE/ARB THERAPY RXD/TAKEN: CPT | Mod: CPTII,,, | Performed by: NURSE PRACTITIONER

## 2024-04-12 PROCEDURE — 3061F NEG MICROALBUMINURIA REV: CPT | Mod: CPTII,,, | Performed by: NURSE PRACTITIONER

## 2024-04-12 PROCEDURE — 3079F DIAST BP 80-89 MM HG: CPT | Mod: CPTII,,, | Performed by: NURSE PRACTITIONER

## 2024-04-12 PROCEDURE — 3075F SYST BP GE 130 - 139MM HG: CPT | Mod: CPTII,,, | Performed by: NURSE PRACTITIONER

## 2024-04-12 RX ORDER — ASPIRIN 325 MG
50000 TABLET, DELAYED RELEASE (ENTERIC COATED) ORAL WEEKLY
Qty: 12 CAPSULE | Refills: 0 | Status: SHIPPED | OUTPATIENT
Start: 2024-04-12 | End: 2024-06-29

## 2024-04-12 NOTE — PATIENT INSTRUCTIONS
Ricki Valentine,     If you are due for any health screening(s) below please notify me so we can arrange them to be ordered and scheduled. Most healthy patients at your age complete them, but you are free to accept or refuse.     If you can't do it, I'll definitely understand. If you can, I'd certainly appreciate it!    Tests to Keep You Healthy    Mammogram: ORDERED BUT NOT SCHEDULED  Colon Cancer Screening: Met on 1/22/2024  Last Blood Pressure <= 139/89 (4/12/2024): Yes  Tobacco Cessation: NO      Schedule your breast cancer screening today     Breast cancer is the second most common cancer in women,  and the second leading cause of death from cancer. Mammograms can detect breast cancer early, which significantly increases the chances of curing the cancer.       Our records indicate that you may be overdue for breast cancer screening. Cancer screenings save lives, so schedule yours today to stay healthy.     If you recently had a mammogram performed outside of [facility], please let your Health care team know so that they can update your health record.

## 2024-04-16 NOTE — PROGRESS NOTES
Patient ID: [id number]     Chief Complaint: Annual Exam      HPI:     Meme Arreola is a 53 y.o. female here today for an annual wellness visit. No other complaints today.       Health Maintenance   Topic Date Due    Hepatitis C Screening  Never done    Shingles Vaccine (1 of 2) Never done    Mammogram  03/07/2024    TETANUS VACCINE  10/06/2027    Lipid Panel  03/19/2029    Colorectal Cancer Screening  01/22/2034     Dental Exam - Recommend biannually  Vaccinations -   Immunization History   Administered Date(s) Administered    COVID-19, MRNA, LN-S, PF (MODERNA FULL 0.5 ML DOSE) 08/19/2021, 10/12/2021    Tdap 10/06/2017        Past Medical History:  has a past medical history of Cervical spondylosis, Cervicalgia, Hypertension, Hypothyroidism, unspecified, Neck pain, Neuropathy, Nicotine dependence, cigarettes, uncomplicated, Obesity, unspecified, Personal history of colonic polyps, Polyneuropathy, and Shoulder joint pain.    Surgical History:  has a past surgical history that includes Hysterectomy and Colonoscopy (07/12/2021).    Family History: family history includes Guillain-[place] syndrome in her mother.    Social History:  reports that she has quit smoking. Her smoking use included vaping with nicotine and cigarettes. She has never used smokeless tobacco. She reports current alcohol use. She reports that she does not use drugs.    Current Outpatient Medications   Medication Instructions    brimonidine-timoloL (COMBIGAN) 0.2-0.5 % Drop 1 drop, Both Eyes, 2 times daily    busPIRone (BUSPAR) 10 MG tablet TAKE ONE TABLET BY MOUTH 3 TIMES A DAY    cholecalciferol (vitamin D3) 50,000 Units, Oral, Weekly    cyclobenzaprine (FLEXERIL) 10 MG tablet TAKE ONE TABLET BY MOUTH 3 TIMES A DAY AS NEEDED FOR MUSCLE SPASMS    gabapentin (NEURONTIN) 300 MG capsule TAKE 1 CAPSULE BY MOUTH 3 TIMES DAILY     mg tablet TAKE ONE TABLET BY MOUTH 3 TIMES A DAY AS NEEDED FOR PAIN    levocetirizine (XYZAL) 5 MG tablet TAKE  "ONE TABLET BY MOUTH EACH EVENING.    levothyroxine (SYNTHROID) 25 MCG tablet TAKE ONE TABLET BY MOUTH DAILY    lisinopriL (PRINIVIL,ZESTRIL) 20 MG tablet TAKE 1 TABLET BY MOUTH DAILY       Patient is allergic to penicillin.     Patient Care Team:  Mary Fermin FNP as PCP - General (Nurse Practitioner)  Angeline Reyes LPN as Care Coordinator  Leodan Rowan MD as Consulting Physician (Gastroenterology)       Subjective:     Review of Systems    12 point review of systems conducted, negative except as stated in the history of present illness. See HPI for details.      Objective:     Visit Vitals  /82 (BP Location: Left arm, Patient Position: Sitting)   Pulse 85   Temp 98.3 °F (36.8 °C) (Oral)   Resp 18   Ht 5' 2.99" (1.6 m)   Wt 122.5 kg (270 lb)   SpO2 98%   BMI 47.84 kg/m²       Physical Exam    General: Alert and oriented, No acute distress.  Head: Normocephalic, Atraumatic.  Eye: Pupils are equal, round and reactive to light, Extraocular movements are intact, Sclera non-icteric.  Ears/Nose/Throat: Normal, Mucosa moist,Clear.  Neck/Thyroid: Supple, Non-tender, No carotid bruit, No lymphadenopathy, No JVD, Full range of motion.  Respiratory: Clear to auscultation bilaterally; No wheezes, rales or rhonchi,Non-labored respirations, Symmetrical chest wall expansion.  Cardiovascular: Regular rate and rhythm, S1/S2 normal, No murmurs, rubs or gallops.  Gastrointestinal: Soft, Non-tender, Non-distended, Normal bowel sounds, No palpable organomegaly.  Musculoskeletal: Normal range of motion.  Integumentary: Warm, Dry, Intact, No suspicious lesions or rashes.  Extremities: No clubbing, cyanosis or edema  Neurologic: No focal deficits, Cranial Nerves II-XII are grossly intact, Motor strength normal upper and lower extremities, Sensory exam intact.  Psychiatric: Normal interaction, Coherent speech, Euthymic mood, Appropriate affect     Labs Reviewed:     Chemistry:  Lab Results   Component Value Date    "  03/19/2024    K 4.5 03/19/2024    CHLORIDE 107 03/19/2024    BUN 11.0 03/19/2024    CREATININE 0.74 03/19/2024    EGFRNORACEVR >60 03/19/2024    GLUCOSE 107 (H) 03/19/2024    CALCIUM 9.6 03/19/2024    ALKPHOS 78 03/19/2024    LABPROT 7.0 03/19/2024    ALBUMIN 3.6 03/19/2024    BILIDIR 0.1 08/25/2021    IBILI 0.30 08/25/2021    AST 31 03/19/2024    ALT 55 03/19/2024    MG 2.2 06/09/2017    [id number] 21.5 (L) 03/19/2024    TSH 2.446 03/19/2024    CDTCHF1LJIK 0.81 03/19/2024        Lab Results   Component Value Date    HGBA1C 5.1 03/19/2024        Hematology:  Lab Results   Component Value Date    WBC 3.93 (L) 03/19/2024    HGB 13.5 03/19/2024    HCT 40.7 03/19/2024     03/19/2024       Lipid Panel:  Lab Results   Component Value Date    CHOL 193 03/19/2024    HDL 46 03/19/2024    .00 03/19/2024    TRIG 92 03/19/2024    TOTALCHOLEST 4 03/19/2024        Urine:  Lab Results   Component Value Date    APPEARANCEUA Clear 06/09/2017    PHUA 5.5 06/09/2017    PROTEINUA Negative 06/09/2017    LEUKOCYTESUR Negative 06/09/2017    RBCUA None Seen 06/09/2017    BACTERIA None Seen 06/09/2017    CREATRANDUR 71.8 03/19/2024          Assessment:       ICD-10-CM ICD-9-CM   1. Wellness examination  Z00.00 V70.0   2. Breast cancer screening by mammogram  Z12.31 V76.12        Plan:     1. Wellness examination  Healthy lifestyle discussed.  Breast cancer screening ordered.    2. Breast cancer screening by mammogram  - Mammo Digital Screening Bilat w/ William; Future         Follow up in about 6 months (around 10/12/2024) for HTN, Hypothyroid. In addition to their scheduled follow up, the patient has also been instructed to follow up on as needed basis.     Future Appointments   Date Time Provider Department Center   4/22/2024  8:30 AM [facility]   10/14/2024  8:00 AM Mary Fermin FNP [facility]        JOE Montemayor

## 2024-04-22 ENCOUNTER — HOSPITAL ENCOUNTER (OUTPATIENT)
Dept: RADIOLOGY | Facility: HOSPITAL | Age: 54
Discharge: HOME | End: 2024-04-22
Attending: NURSE PRACTITIONER
Payer: MEDICAID

## 2024-04-22 DIAGNOSIS — Z12.31 BREAST CANCER SCREENING BY MAMMOGRAM: ICD-10-CM

## 2024-04-22 PROCEDURE — 77063 BREAST TOMOSYNTHESIS BI: CPT | Mod: TC

## 2024-04-22 PROCEDURE — 77067 SCR MAMMO BI INCL CAD: CPT | Mod: 26,,, | Performed by: RADIOLOGY

## 2024-04-22 PROCEDURE — 77063 BREAST TOMOSYNTHESIS BI: CPT | Mod: 26,,, | Performed by: RADIOLOGY

## 2024-04-23 ENCOUNTER — TELEPHONE (OUTPATIENT)
Dept: FAMILY MEDICINE | Facility: CLINIC | Age: 54
End: 2024-04-23
Payer: MEDICAID

## 2024-04-23 NOTE — TELEPHONE ENCOUNTER
Pt aware. Understanding voiced.     ----- Message from JOE Montemayor sent at 4/23/2024  9:44 AM CDT -----  Normal MMG. Repeat in 1 year.

## 2024-05-30 ENCOUNTER — PATIENT MESSAGE (OUTPATIENT)
Dept: NEUROSURGERY | Facility: CLINIC | Age: 54
End: 2024-05-30
Payer: MEDICAID

## 2024-06-06 DIAGNOSIS — M47.812 CERVICAL SPONDYLOSIS: ICD-10-CM

## 2024-06-06 DIAGNOSIS — Z76.0 MEDICATION REFILL: ICD-10-CM

## 2024-06-06 DIAGNOSIS — E03.9 HYPOTHYROIDISM, UNSPECIFIED TYPE: Primary | ICD-10-CM

## 2024-06-06 RX ORDER — BRIMONIDINE TARTRATE, TIMOLOL MALEATE 2; 5 MG/ML; MG/ML
SOLUTION/ DROPS OPHTHALMIC
Qty: 5 ML | Refills: 1 | Status: SHIPPED | OUTPATIENT
Start: 2024-06-06

## 2024-06-06 RX ORDER — LEVOTHYROXINE SODIUM 25 UG/1
TABLET ORAL
Qty: 30 TABLET | Refills: 11 | Status: SHIPPED | OUTPATIENT
Start: 2024-06-06

## 2024-06-06 RX ORDER — IBUPROFEN 800 MG/1
TABLET ORAL
Qty: 30 TABLET | Refills: 6 | Status: SHIPPED | OUTPATIENT
Start: 2024-06-06

## 2024-07-15 PROBLEM — Z00.00 WELLNESS EXAMINATION: Status: RESOLVED | Noted: 2023-04-10 | Resolved: 2024-07-15

## 2024-08-08 DIAGNOSIS — Z76.0 MEDICATION REFILL: ICD-10-CM

## 2024-08-08 RX ORDER — LEVOCETIRIZINE DIHYDROCHLORIDE 5 MG/1
TABLET, FILM COATED ORAL
Qty: 30 TABLET | Refills: 11 | Status: SHIPPED | OUTPATIENT
Start: 2024-08-08

## 2024-08-08 RX ORDER — BUSPIRONE HYDROCHLORIDE 10 MG/1
TABLET ORAL
Qty: 90 TABLET | Refills: 11 | Status: SHIPPED | OUTPATIENT
Start: 2024-08-08

## 2024-08-23 ENCOUNTER — TELEPHONE (OUTPATIENT)
Dept: FAMILY MEDICINE | Facility: CLINIC | Age: 54
End: 2024-08-23
Payer: MEDICAID

## 2024-08-23 DIAGNOSIS — Z76.0 MEDICATION REFILL: ICD-10-CM

## 2024-08-23 RX ORDER — BRIMONIDINE TARTRATE, TIMOLOL MALEATE 2; 5 MG/ML; MG/ML
SOLUTION/ DROPS OPHTHALMIC
Qty: 5 ML | Refills: 1 | Status: SHIPPED | OUTPATIENT
Start: 2024-08-23

## 2024-08-23 NOTE — TELEPHONE ENCOUNTER
Pharmacy made aware.----- Message from JOE Montemayor sent at 8/23/2024 11:43 AM CDT -----  Yes that's fine.  ----- Message -----  From: Kaitlin Batista LPN  Sent: 8/23/2024   9:00 AM CDT  To: JOE Montemayor    Please advise.  ----- Message -----  From: Gerhard Rodriguez  Sent: 8/23/2024   8:55 AM CDT  To: Jeny Wakefield    .Type:  Pharmacy Calling to Clarify an RX    Name of Caller:daisy   Pharmacy Name:[facility]   Prescription Name:COMBIGAN 0.2-0.5 % Drop  What do they need to clarify?:can a generic be dispensed   Best Call Back Number:[phone]  Additional Information:

## 2024-09-09 DIAGNOSIS — I10 HYPERTENSION, UNSPECIFIED TYPE: ICD-10-CM

## 2024-09-09 DIAGNOSIS — M47.812 CERVICAL SPONDYLOSIS: ICD-10-CM

## 2024-09-09 RX ORDER — IBUPROFEN 800 MG/1
800 TABLET ORAL 3 TIMES DAILY PRN
Qty: 30 TABLET | Refills: 6 | Status: SHIPPED | OUTPATIENT
Start: 2024-09-09

## 2024-09-09 RX ORDER — LISINOPRIL 20 MG/1
20 TABLET ORAL DAILY
Qty: 30 TABLET | Refills: 11 | Status: SHIPPED | OUTPATIENT
Start: 2024-09-09

## 2024-10-07 ENCOUNTER — TELEPHONE (OUTPATIENT)
Dept: FAMILY MEDICINE | Facility: CLINIC | Age: 54
End: 2024-10-07
Payer: MEDICAID

## 2024-10-07 DIAGNOSIS — E03.9 HYPOTHYROIDISM, UNSPECIFIED TYPE: Primary | ICD-10-CM

## 2024-10-08 ENCOUNTER — LAB VISIT (OUTPATIENT)
Dept: LAB | Facility: HOSPITAL | Age: 54
End: 2024-10-08
Attending: NURSE PRACTITIONER
Payer: MEDICAID

## 2024-10-08 DIAGNOSIS — E03.9 HYPOTHYROIDISM, UNSPECIFIED TYPE: ICD-10-CM

## 2024-10-08 LAB — TSH SERPL-ACNC: 2.12 UIU/ML (ref 0.35–4.94)

## 2024-10-08 PROCEDURE — 36415 COLL VENOUS BLD VENIPUNCTURE: CPT

## 2024-10-08 PROCEDURE — 84443 ASSAY THYROID STIM HORMONE: CPT

## 2024-10-14 ENCOUNTER — OFFICE VISIT (OUTPATIENT)
Dept: FAMILY MEDICINE | Facility: CLINIC | Age: 54
End: 2024-10-14
Payer: MEDICAID

## 2024-10-14 VITALS
OXYGEN SATURATION: 98 % | TEMPERATURE: 98 F | HEART RATE: 75 BPM | DIASTOLIC BLOOD PRESSURE: 86 MMHG | HEIGHT: 63 IN | RESPIRATION RATE: 18 BRPM | BODY MASS INDEX: 48.02 KG/M2 | SYSTOLIC BLOOD PRESSURE: 123 MMHG | WEIGHT: 271 LBS

## 2024-10-14 DIAGNOSIS — Z23 FLU VACCINE NEED: ICD-10-CM

## 2024-10-14 DIAGNOSIS — E03.9 HYPOTHYROIDISM, UNSPECIFIED TYPE: ICD-10-CM

## 2024-10-14 DIAGNOSIS — Z00.00 WELL ADULT EXAM: ICD-10-CM

## 2024-10-14 DIAGNOSIS — I10 HYPERTENSION, UNSPECIFIED TYPE: Primary | ICD-10-CM

## 2024-10-14 PROCEDURE — 3066F NEPHROPATHY DOC TX: CPT | Mod: CPTII,,, | Performed by: NURSE PRACTITIONER

## 2024-10-14 PROCEDURE — 1159F MED LIST DOCD IN RCRD: CPT | Mod: CPTII,,, | Performed by: NURSE PRACTITIONER

## 2024-10-14 PROCEDURE — 90471 IMMUNIZATION ADMIN: CPT | Mod: ,,, | Performed by: NURSE PRACTITIONER

## 2024-10-14 PROCEDURE — 3044F HG A1C LEVEL LT 7.0%: CPT | Mod: CPTII,,, | Performed by: NURSE PRACTITIONER

## 2024-10-14 PROCEDURE — 99214 OFFICE O/P EST MOD 30 MIN: CPT | Mod: 25,,, | Performed by: NURSE PRACTITIONER

## 2024-10-14 PROCEDURE — 3074F SYST BP LT 130 MM HG: CPT | Mod: CPTII,,, | Performed by: NURSE PRACTITIONER

## 2024-10-14 PROCEDURE — 3079F DIAST BP 80-89 MM HG: CPT | Mod: CPTII,,, | Performed by: NURSE PRACTITIONER

## 2024-10-14 PROCEDURE — 4010F ACE/ARB THERAPY RXD/TAKEN: CPT | Mod: CPTII,,, | Performed by: NURSE PRACTITIONER

## 2024-10-14 PROCEDURE — 90656 IIV3 VACC NO PRSV 0.5 ML IM: CPT | Mod: ,,, | Performed by: NURSE PRACTITIONER

## 2024-10-14 PROCEDURE — 3008F BODY MASS INDEX DOCD: CPT | Mod: CPTII,,, | Performed by: NURSE PRACTITIONER

## 2024-10-14 PROCEDURE — 3061F NEG MICROALBUMINURIA REV: CPT | Mod: CPTII,,, | Performed by: NURSE PRACTITIONER

## 2024-10-14 PROCEDURE — 1160F RVW MEDS BY RX/DR IN RCRD: CPT | Mod: CPTII,,, | Performed by: NURSE PRACTITIONER

## 2024-10-14 NOTE — PROGRESS NOTES
Patient ID: [id number]     Chief Complaint: Hypothyroidism (6 mo follow)      HPI:     Meme Arreola is a 54 y.o. female here today for a follow up. No other complaints today.       Past Medical History:  has a past medical history of Cervical spondylosis, Cervicalgia, Hypertension, Hypothyroidism, unspecified, Neck pain, Neuropathy, Nicotine dependence, cigarettes, uncomplicated, Obesity, unspecified, Personal history of colonic polyps, Polyneuropathy, and Shoulder joint pain.    Surgical History:  has a past surgical history that includes Hysterectomy and Colonoscopy (07/12/2021).    Family History: family history includes Guillain-[place] syndrome in her mother.    Social History:  reports that she has quit smoking. Her smoking use included vaping with nicotine and cigarettes. She has never used smokeless tobacco. She reports current alcohol use. She reports that she does not use drugs.    Current Outpatient Medications   Medication Instructions    busPIRone (BUSPAR) 10 MG tablet TAKE ONE TABLET BY MOUTH 3 TIMES A DAY    COMBIGAN 0.2-0.5 % Drop INSTIL 1 DROP INTO BOTH EYES TWICE A DAY    cyclobenzaprine (FLEXERIL) 10 MG tablet TAKE ONE TABLET BY MOUTH 3 TIMES A DAY AS NEEDED FOR MUSCLE SPASMS    gabapentin (NEURONTIN) 300 MG capsule TAKE 1 CAPSULE BY MOUTH 3 TIMES DAILY    ibuprofen (IBU) 800 mg, Oral, 3 times daily PRN    levocetirizine (XYZAL) 5 MG tablet TAKE ONE TABLET BY MOUTH EACH EVENING.    levothyroxine (SYNTHROID) 25 MCG tablet TAKE ONE TABLET BY MOUTH DAILY    lisinopriL (PRINIVIL,ZESTRIL) 20 mg, Oral, Daily       Patient is allergic to penicillin.     Patient Care Team:  Mary Fermin FNP as PCP - General (Nurse Practitioner)  Angeline Reyes LPN as Care Coordinator  Leodan Rowan MD as Consulting Physician (Gastroenterology)       Subjective:     Review of Systems    12 point review of systems conducted, negative except as stated in the history of present illness. See HPI for  "details.      Objective:     Visit Vitals  /86   Pulse 75   Temp 98 °F (36.7 °C) (Oral)   Resp 18   Ht 5' 2.99" (1.6 m)   Wt 122.9 kg (271 lb)   SpO2 98%   BMI 48.02 kg/m²       Physical Exam    General: Alert and oriented, No acute distress.  Head: Normocephalic, Atraumatic.  Eye: Pupils are equal, round and reactive to light, Extraocular movements are intact, Sclera non-icteric.  Ears/Nose/Throat: Normal, Mucosa moist,Clear.  Neck/Thyroid: Supple, Non-tender, No carotid bruit, No lymphadenopathy, No JVD, Full range of motion.  Respiratory: Clear to auscultation bilaterally; No wheezes, rales or rhonchi,Non-labored respirations, Symmetrical chest wall expansion.  Cardiovascular: Regular rate and rhythm, S1/S2 normal, No murmurs, rubs or gallops.  Gastrointestinal: Soft, Non-tender, Non-distended, Normal bowel sounds, No palpable organomegaly.  Musculoskeletal: Normal range of motion.  Integumentary: Warm, Dry, Intact, No suspicious lesions or rashes.  Extremities: No clubbing, cyanosis or edema  Neurologic: No focal deficits, Cranial Nerves II-XII are grossly intact, Motor strength normal upper and lower extremities, Sensory exam intact.  Psychiatric: Normal interaction, Coherent speech, Euthymic mood, Appropriate affect     Labs Reviewed:     Chemistry:  Lab Results   Component Value Date     03/19/2024    K 4.5 03/19/2024    BUN 11.0 03/19/2024    CREATININE 0.74 03/19/2024    EGFRNORACEVR >60 03/19/2024    GLUCOSE 107 (H) 03/19/2024    CALCIUM 9.6 03/19/2024    ALKPHOS 78 03/19/2024    LABPROT 7.0 03/19/2024    ALBUMIN 3.6 03/19/2024    BILIDIR 0.1 08/25/2021    IBILI 0.30 08/25/2021    AST 31 03/19/2024    ALT 55 03/19/2024    MG 2.2 06/09/2017    [id number] 21.5 (L) 03/19/2024    TSH 2.118 10/08/2024    [id number] 0.81 03/19/2024        Lab Results   Component Value Date    HGBA1C 5.1 03/19/2024        Hematology:  Lab Results   Component Value Date    WBC 3.93 (L) 03/19/2024    HGB 13.5 " 03/19/2024    HCT 40.7 03/19/2024     03/19/2024       Lipid Panel:  Lab Results   Component Value Date    CHOL 193 03/19/2024    HDL 46 03/19/2024    .00 03/19/2024    TRIG 92 03/19/2024    TOTALCHOLEST 4 03/19/2024        Urine:  Lab Results   Component Value Date    APPEARANCEUA Clear 06/09/2017    PROTEINUA Negative 06/09/2017    LEUKOCYTESUR Negative 06/09/2017    RBCUA None Seen 06/09/2017    BACTERIA None Seen 06/09/2017    CREATRANDUR 71.8 03/19/2024          Assessment:       ICD-10-CM ICD-9-CM   1. Hypertension, unspecified type  I10 401.9   2. Hypothyroidism, unspecified type  E03.9 244.9   3. Flu vaccine need  Z23 V04.81   4. Well adult exam  Z00.00 V70.0        Plan:   1. Hypertension, unspecified type (Primary)  Well controlled.   Continue   Hypertension Medications               lisinopriL (PRINIVIL,ZESTRIL) 20 MG tablet Take 1 tablet (20 mg total) by mouth once daily.        Low Sodium Diet (DASH Diet - Less than 2 grams of sodium per day).  Monitor blood pressure daily and log. Report consistent numbers greater than 140/90.  Maintain healthy weight with goal BMI <30. Exercise 30 minutes per day, 5 days per week.  Smoking cessation encouraged to aid in BP reduction.    2. Hypothyroidism, unspecified type  Lab Results   Component Value Date    TSH 2.118 10/08/2024    [id number] 0.81 03/19/2024     Continue Levothyroxine 25 mcg po daily.  Take medicine on an empty stomach with water (no other medications or beverages). Wait 30 minutes to eat or drink.  Report any symptoms of thinning hair, breaking nails, fatigue, weight gain or loss, palpitations.     Stressed importance of dietary modifications. Follow a low cholesterol, low saturated fat diet with less that 200mg of cholesterol a day.  Avoid fried foods and high saturated fats (high saturated fats less than 7% of calories).  Add Flax Seed/Fish Oil supplements to diet. Increase dietary fiber.  Regular exercise can reduce LDL and raise  HDL. Stressed importance of physical activity 5 times per week for 30 minutes per day.     3. Flu vaccine need  - influenza (Flulaval, Fluzone, Fluarix) 45 mcg/0.5 mL IM vaccine (> or = 6 mo) 0.5 mL     4. Well adult exam  - CBC Auto Differential; Future  - Comprehensive Metabolic Panel; Future  - Lipid Panel; Future  - TSH; Future  - Hemoglobin A1C; Future  - Urinalysis; Future  - T4, Free; Future  - Vitamin D; Future  - Microalbumin/Creatinine Ratio, Urine; Future  - Urinalysis    Follow up in about 6 months (around 4/14/2025) for Wellness. In addition to their scheduled follow up, the patient has also been instructed to follow up on as needed basis.     Future Appointments   Date Time Provider Department Center   4/14/2025  8:00 AM Mary Fermin FNP [facility]        JOE Montemayor

## 2024-12-02 DIAGNOSIS — M47.812 CERVICAL SPONDYLOSIS: ICD-10-CM

## 2024-12-02 RX ORDER — IBUPROFEN 800 MG/1
800 TABLET ORAL
Qty: 30 TABLET | Refills: 6 | Status: SHIPPED | OUTPATIENT
Start: 2024-12-02

## 2024-12-27 DIAGNOSIS — Z76.0 MEDICATION REFILL: ICD-10-CM

## 2024-12-27 RX ORDER — BRIMONIDINE TARTRATE, TIMOLOL MALEATE 2; 5 MG/ML; MG/ML
SOLUTION/ DROPS OPHTHALMIC
Qty: 5 ML | Refills: 1 | Status: SHIPPED | OUTPATIENT
Start: 2024-12-27

## 2025-02-13 DIAGNOSIS — M62.838 MUSCLE SPASM: ICD-10-CM

## 2025-02-13 RX ORDER — CYCLOBENZAPRINE HCL 10 MG
10 TABLET ORAL 3 TIMES DAILY PRN
Qty: 90 TABLET | Refills: 6 | Status: SHIPPED | OUTPATIENT
Start: 2025-02-13

## 2025-02-28 ENCOUNTER — LAB VISIT (OUTPATIENT)
Dept: LAB | Facility: HOSPITAL | Age: 55
End: 2025-02-28
Attending: INTERNAL MEDICINE
Payer: MEDICAID

## 2025-02-28 DIAGNOSIS — Z86.0100 HX OF COLONIC POLYPS: Primary | ICD-10-CM

## 2025-02-28 DIAGNOSIS — Z12.11 SPECIAL SCREENING FOR MALIGNANT NEOPLASMS, COLON: ICD-10-CM

## 2025-02-28 LAB
HEMOCCULT SP1 STL QL: POSITIVE
HEMOCCULT SP2 STL QL: POSITIVE
HEMOCCULT SP3 STL QL: POSITIVE

## 2025-02-28 PROCEDURE — 82270 OCCULT BLOOD FECES: CPT

## 2025-03-02 DIAGNOSIS — M47.812 CERVICAL SPONDYLOSIS: ICD-10-CM

## 2025-03-03 RX ORDER — GABAPENTIN 300 MG/1
CAPSULE ORAL
Qty: 90 CAPSULE | Refills: 11 | Status: SHIPPED | OUTPATIENT
Start: 2025-03-03

## 2025-03-10 DIAGNOSIS — M47.812 CERVICAL SPONDYLOSIS: ICD-10-CM

## 2025-03-10 RX ORDER — IBUPROFEN 800 MG/1
800 TABLET ORAL
Qty: 30 TABLET | Refills: 6 | Status: SHIPPED | OUTPATIENT
Start: 2025-03-10

## 2025-03-19 ENCOUNTER — LAB VISIT (OUTPATIENT)
Dept: LAB | Facility: HOSPITAL | Age: 55
End: 2025-03-19
Attending: NURSE PRACTITIONER
Payer: MEDICAID

## 2025-03-19 ENCOUNTER — RESULTS FOLLOW-UP (OUTPATIENT)
Dept: FAMILY MEDICINE | Facility: CLINIC | Age: 55
End: 2025-03-19

## 2025-03-19 DIAGNOSIS — R19.5 ABNORMAL FECES: Primary | ICD-10-CM

## 2025-03-19 DIAGNOSIS — R12 HEARTBURN: ICD-10-CM

## 2025-03-19 DIAGNOSIS — Z86.0100 HX OF COLONIC POLYPS: ICD-10-CM

## 2025-03-19 DIAGNOSIS — K92.1 BLOOD IN STOOL: ICD-10-CM

## 2025-03-19 DIAGNOSIS — Z00.00 WELL ADULT EXAM: ICD-10-CM

## 2025-03-19 DIAGNOSIS — R11.0 NAUSEA: ICD-10-CM

## 2025-03-19 DIAGNOSIS — R19.7 DIARRHEA OF PRESUMED INFECTIOUS ORIGIN: ICD-10-CM

## 2025-03-19 LAB
25(OH)D3+25(OH)D2 SERPL-MCNC: 24 NG/ML (ref 30–80)
BASOPHILS # BLD AUTO: 0.03 X10(3)/MCL
BASOPHILS NFR BLD AUTO: 0.7 %
CREAT UR-MCNC: 24.2 MG/DL (ref 45–106)
EOSINOPHIL # BLD AUTO: 0.05 X10(3)/MCL (ref 0–0.9)
EOSINOPHIL NFR BLD AUTO: 1.2 %
ERYTHROCYTE [DISTWIDTH] IN BLOOD BY AUTOMATED COUNT: 12.4 % (ref 11.5–17)
HCT VFR BLD AUTO: 40.9 % (ref 37–47)
HGB BLD-MCNC: 13.8 G/DL (ref 12–16)
IMM GRANULOCYTES # BLD AUTO: 0.01 X10(3)/MCL (ref 0–0.04)
IMM GRANULOCYTES NFR BLD AUTO: 0.2 %
LYMPHOCYTES # BLD AUTO: 1.3 X10(3)/MCL (ref 0.6–4.6)
LYMPHOCYTES NFR BLD AUTO: 31.3 %
MCH RBC QN AUTO: 31.7 PG (ref 27–31)
MCHC RBC AUTO-ENTMCNC: 33.7 G/DL (ref 33–36)
MCV RBC AUTO: 94 FL (ref 80–94)
MICROALBUMIN UR-MCNC: <5 UG/ML
MICROALBUMIN/CREAT RATIO PNL UR: ABNORMAL
MONOCYTES # BLD AUTO: 0.23 X10(3)/MCL (ref 0.1–1.3)
MONOCYTES NFR BLD AUTO: 5.5 %
NEUTROPHILS # BLD AUTO: 2.54 X10(3)/MCL (ref 2.1–9.2)
NEUTROPHILS NFR BLD AUTO: 61.1 %
PLATELET # BLD AUTO: 170 X10(3)/MCL (ref 130–400)
PMV BLD AUTO: 9.7 FL (ref 7.4–10.4)
RBC # BLD AUTO: 4.35 X10(6)/MCL (ref 4.2–5.4)
TSH SERPL-ACNC: 2.41 UIU/ML (ref 0.35–4.94)
WBC # BLD AUTO: 4.16 X10(3)/MCL (ref 4.5–11.5)

## 2025-03-19 PROCEDURE — 86231 EMA EACH IG CLASS: CPT

## 2025-03-19 PROCEDURE — 82043 UR ALBUMIN QUANTITATIVE: CPT

## 2025-03-19 PROCEDURE — 86364 TISS TRNSGLTMNASE EA IG CLAS: CPT

## 2025-03-19 PROCEDURE — 85025 COMPLETE CBC W/AUTO DIFF WBC: CPT

## 2025-03-19 PROCEDURE — 82306 VITAMIN D 25 HYDROXY: CPT

## 2025-03-19 PROCEDURE — 36415 COLL VENOUS BLD VENIPUNCTURE: CPT

## 2025-03-19 PROCEDURE — 84443 ASSAY THYROID STIM HORMONE: CPT

## 2025-03-21 ENCOUNTER — LAB VISIT (OUTPATIENT)
Dept: LAB | Facility: HOSPITAL | Age: 55
End: 2025-03-21
Attending: PHYSICIAN ASSISTANT
Payer: MEDICAID

## 2025-03-21 DIAGNOSIS — R19.7 DIARRHEA OF PRESUMED INFECTIOUS ORIGIN: ICD-10-CM

## 2025-03-21 DIAGNOSIS — Z86.0100 HX OF COLONIC POLYPS: ICD-10-CM

## 2025-03-21 DIAGNOSIS — R19.5 ABNORMAL FECES: Primary | ICD-10-CM

## 2025-03-21 LAB
C DIFF TOX A+B STL QL IA: NEGATIVE
CLOSTRIDIUM DIFFICILE GDH ANTIGEN (OHS): NEGATIVE
FECAL LEUKOCYTE (OHS): NEGATIVE
TTG IGA SER IA-ACNC: <1.2 U/ML

## 2025-03-21 PROCEDURE — 86318 IA INFECTIOUS AGENT ANTIBODY: CPT

## 2025-03-21 PROCEDURE — 87329 GIARDIA AG IA: CPT

## 2025-03-21 PROCEDURE — 87209 SMEAR COMPLEX STAIN: CPT

## 2025-03-21 PROCEDURE — 87427 SHIGA-LIKE TOXIN AG IA: CPT

## 2025-03-21 PROCEDURE — 89055 LEUKOCYTE ASSESSMENT FECAL: CPT

## 2025-03-22 LAB
CRYPTOSP AG SPEC QL: NEGATIVE
G LAMBLIA AG STL QL IA: NEGATIVE

## 2025-03-23 LAB — BACTERIA STL CULT: NORMAL

## 2025-03-24 LAB — ENDOMYSIUM IGA SER QL IF: NEGATIVE

## 2025-03-25 DIAGNOSIS — Z76.0 MEDICATION REFILL: ICD-10-CM

## 2025-03-25 RX ORDER — BRIMONIDINE TARTRATE AND TIMOLOL MALEATE 2; 5 MG/ML; MG/ML
SOLUTION OPHTHALMIC
Qty: 5 ML | Refills: 3 | Status: SHIPPED | OUTPATIENT
Start: 2025-03-25

## 2025-03-26 LAB — O+P STL MICRO: NORMAL

## 2025-04-07 ENCOUNTER — TELEPHONE (OUTPATIENT)
Dept: FAMILY MEDICINE | Facility: CLINIC | Age: 55
End: 2025-04-07
Payer: MEDICAID

## 2025-04-07 DIAGNOSIS — Z00.00 WELLNESS EXAMINATION: Primary | ICD-10-CM

## 2025-04-14 ENCOUNTER — OFFICE VISIT (OUTPATIENT)
Dept: FAMILY MEDICINE | Facility: CLINIC | Age: 55
End: 2025-04-14
Payer: MEDICAID

## 2025-04-14 VITALS
BODY MASS INDEX: 48.53 KG/M2 | TEMPERATURE: 98 F | HEART RATE: 82 BPM | DIASTOLIC BLOOD PRESSURE: 87 MMHG | SYSTOLIC BLOOD PRESSURE: 135 MMHG | OXYGEN SATURATION: 99 % | WEIGHT: 273.88 LBS | HEIGHT: 63 IN | RESPIRATION RATE: 18 BRPM

## 2025-04-14 DIAGNOSIS — Z00.00 WELLNESS EXAMINATION: Primary | ICD-10-CM

## 2025-04-14 DIAGNOSIS — E66.9 OBESITY, UNSPECIFIED CLASS, UNSPECIFIED OBESITY TYPE, UNSPECIFIED WHETHER SERIOUS COMORBIDITY PRESENT: ICD-10-CM

## 2025-04-14 DIAGNOSIS — M54.31 SCIATICA OF RIGHT SIDE: ICD-10-CM

## 2025-04-14 DIAGNOSIS — I10 PRIMARY HYPERTENSION: ICD-10-CM

## 2025-04-14 DIAGNOSIS — Z12.31 BREAST CANCER SCREENING BY MAMMOGRAM: ICD-10-CM

## 2025-04-14 PROCEDURE — 1160F RVW MEDS BY RX/DR IN RCRD: CPT | Mod: CPTII,,, | Performed by: NURSE PRACTITIONER

## 2025-04-14 PROCEDURE — 99396 PREV VISIT EST AGE 40-64: CPT | Mod: ,,, | Performed by: NURSE PRACTITIONER

## 2025-04-14 PROCEDURE — 3079F DIAST BP 80-89 MM HG: CPT | Mod: CPTII,,, | Performed by: NURSE PRACTITIONER

## 2025-04-14 PROCEDURE — 3008F BODY MASS INDEX DOCD: CPT | Mod: CPTII,,, | Performed by: NURSE PRACTITIONER

## 2025-04-14 PROCEDURE — 3061F NEG MICROALBUMINURIA REV: CPT | Mod: CPTII,,, | Performed by: NURSE PRACTITIONER

## 2025-04-14 PROCEDURE — 3075F SYST BP GE 130 - 139MM HG: CPT | Mod: CPTII,,, | Performed by: NURSE PRACTITIONER

## 2025-04-14 PROCEDURE — 1159F MED LIST DOCD IN RCRD: CPT | Mod: CPTII,,, | Performed by: NURSE PRACTITIONER

## 2025-04-14 PROCEDURE — 4010F ACE/ARB THERAPY RXD/TAKEN: CPT | Mod: CPTII,,, | Performed by: NURSE PRACTITIONER

## 2025-04-14 PROCEDURE — 3066F NEPHROPATHY DOC TX: CPT | Mod: CPTII,,, | Performed by: NURSE PRACTITIONER

## 2025-04-14 RX ORDER — PREDNISONE 20 MG/1
20 TABLET ORAL DAILY
Qty: 5 TABLET | Refills: 0 | Status: SHIPPED | OUTPATIENT
Start: 2025-04-14 | End: 2025-04-19

## 2025-04-14 RX ORDER — PANTOPRAZOLE SODIUM 40 MG/1
40 TABLET, DELAYED RELEASE ORAL EVERY MORNING
COMMUNITY
Start: 2025-04-08

## 2025-04-14 NOTE — ASSESSMENT & PLAN NOTE
Well controlled.   Continue  Hypertension Medications              lisinopriL (PRINIVIL,ZESTRIL) 20 MG tablet Take 1 tablet (20 mg total) by mouth once daily.        Low Sodium Diet (DASH Diet - Less than 2 grams of sodium per day).  Monitor blood pressure daily and log. Report consistent numbers greater than 140/90.  Maintain healthy weight with goal BMI <30. Exercise 30 minutes per day, 5 days per week.  Smoking cessation encouraged to aid in BP reduction.

## 2025-04-14 NOTE — ASSESSMENT & PLAN NOTE

## 2025-04-14 NOTE — PROGRESS NOTES
Patient ID: [id number]     Chief Complaint: Annual Exam      HPI:     Meme Arreola is a 54 y.o. female here today for an annual wellness visit.  She presents with complaints of right lower extremity pain and numbness.  Patient reports needle stick sensations.  Patient reports pain begins at right hip and radiates down right leg.  Denies any recent trauma or injury.  Denies any alleviating factors.    Health Maintenance   Topic Date Due    Hepatitis C Screening  Never done    HIV Screening  Never done    Shingles Vaccine (1 of 2) Never done    Pneumococcal Vaccines (Age 50+) (1 of 1 - PCV) Never done    COVID-19 Vaccine (3 - 2024-25 season) 09/01/2024    Mammogram  04/23/2025    Hemoglobin A1c (Diabetic Prevention Screening)  03/19/2027    TETANUS VACCINE  10/06/2027    Lipid Panel  03/19/2029    Colorectal Cancer Screening  01/22/2034    RSV Vaccine (Age 60+ and Pregnant patients) (1 - 1-dose 75+ series) 10/09/2044    Influenza Vaccine  Completed       Past Medical History:  has a past medical history of Cervical spondylosis, Cervicalgia, Esophageal hiatal hernia, Hypertension, Hypothyroidism, unspecified, Neck pain, Neuropathy, Nicotine dependence, cigarettes, uncomplicated, Obesity, unspecified, Personal history of colonic polyps, Polyneuropathy, and Shoulder joint pain.    Surgical History:  has a past surgical history that includes Hysterectomy; Colonoscopy (07/12/2021); and Endoscopy.    Family History: family history includes Guillain-[place] syndrome in her mother.    Social History:  reports that she has quit smoking. Her smoking use included vaping with nicotine and cigarettes. She has never used smokeless tobacco. She reports current alcohol use. She reports that she does not use drugs.    Current Outpatient Medications   Medication Instructions    brimonidine-timoloL (COMBIGAN) 0.2-0.5 % Drop INSTIL 1 DROP INTO BOTH EYES TWICE A DAY    busPIRone (BUSPAR) 10 MG tablet TAKE ONE TABLET BY MOUTH 3 TIMES  "A DAY    cyclobenzaprine (FLEXERIL) 10 mg, Oral, 3 times daily PRN    gabapentin (NEURONTIN) 300 MG capsule TAKE 1 CAPSULE BY MOUTH 3 TIMES DAILY    ibuprofen (ADVIL,MOTRIN) 800 mg, Oral    levocetirizine (XYZAL) 5 MG tablet TAKE ONE TABLET BY MOUTH EACH EVENING.    levothyroxine (SYNTHROID) 25 MCG tablet TAKE ONE TABLET BY MOUTH DAILY    lisinopriL (PRINIVIL,ZESTRIL) 20 mg, Oral, Daily    pantoprazole (PROTONIX) 40 mg, Every morning    predniSONE (DELTASONE) 20 mg, Oral, Daily       Patient is allergic to penicillin.     Patient Care Team:  Mary Fermin FNP as PCP - General (Nurse Practitioner)  Angeline Reyes LPN as Care Coordinator  Leodan Rowan MD as Consulting Physician (Gastroenterology)  Gladis Ogden PA-C (Family Medicine)       Subjective:     Review of Systems    12 point review of systems conducted, negative except as stated in the history of present illness. See HPI for details.      Objective:     Visit Vitals  /87 (BP Location: Right arm, Patient Position: Sitting)   Pulse 82   Temp 98.2 °F (36.8 °C) (Oral)   Resp 18   Ht 5' 2.99" (1.6 m)   Wt 124.2 kg (273 lb 14.4 oz)   SpO2 99%   BMI 48.53 kg/m²       Physical Exam    General: Alert and oriented, No acute distress.  Head: Normocephalic, Atraumatic.  Eye: Pupils are equal, round and reactive to light, Extraocular movements are intact, Sclera non-icteric.  Ears/Nose/Throat: Normal, Mucosa moist,Clear.  Neck/Thyroid: Supple, Non-tender, No carotid bruit, No lymphadenopathy, No JVD, Full range of motion.  Respiratory: Clear to auscultation bilaterally; No wheezes, rales or rhonchi,Non-labored respirations, Symmetrical chest wall expansion.  Cardiovascular: Regular rate and rhythm, S1/S2 normal, No murmurs, rubs or gallops.  Gastrointestinal: Soft, Non-tender, Non-distended, Normal bowel sounds, No palpable organomegaly.  Musculoskeletal: Normal range of motion.  Integumentary: Warm, Dry, Intact, No suspicious lesions or " rashes.  Extremities: No clubbing, cyanosis or edema  Neurologic: No focal deficits, Cranial Nerves II-XII are grossly intact, Motor strength normal upper and lower extremities, Sensory exam intact.  Psychiatric: Normal interaction, Coherent speech, Euthymic mood, Appropriate affect     Labs Reviewed:     Chemistry:  Lab Results   Component Value Date     03/19/2024    K 4.5 03/19/2024    BUN 11.0 03/19/2024    CREATININE 0.74 03/19/2024    EGFRNORACEVR >60 03/19/2024    GLUCOSE 107 (H) 03/19/2024    CALCIUM 9.6 03/19/2024    ALKPHOS 78 03/19/2024    LABPROT 7.0 03/19/2024    ALBUMIN 3.6 03/19/2024    BILIDIR 0.1 08/25/2021    IBILI 0.30 08/25/2021    AST 31 03/19/2024    ALT 55 03/19/2024    MG 2.2 06/09/2017    [id number] 24 (L) 03/19/2025    TSH 2.414 03/19/2025    [id number] 0.81 03/19/2024        Lab Results   Component Value Date    HGBA1C 5.1 03/19/2024        Hematology:  Lab Results   Component Value Date    WBC 4.16 (L) 03/19/2025    HGB 13.8 03/19/2025    HCT 40.9 03/19/2025     03/19/2025       Lipid Panel:  Lab Results   Component Value Date    CHOL 193 03/19/2024    HDL 46 03/19/2024    .00 03/19/2024    TRIG 92 03/19/2024    TOTALCHOLEST 4 03/19/2024        Urine:  Lab Results   Component Value Date    APPEARANCEUA Clear 06/09/2017    PROTEINUA Negative 06/09/2017    LEUKOCYTESUR Negative 06/09/2017    RBCUA None Seen 06/09/2017    BACTERIA None Seen 06/09/2017    CREATRANDUR 24.2 (L) 03/19/2025          Assessment/Plan     Assessment & Plan  Wellness examination  Healthy lifestyle discussed.  Primary hypertension  Well controlled.   Continue  Hypertension Medications              lisinopriL (PRINIVIL,ZESTRIL) 20 MG tablet Take 1 tablet (20 mg total) by mouth once daily.        Low Sodium Diet (DASH Diet - Less than 2 grams of sodium per day).  Monitor blood pressure daily and log. Report consistent numbers greater than 140/90.  Maintain healthy weight with goal BMI <30.  Exercise 30 minutes per day, 5 days per week.  Smoking cessation encouraged to aid in BP reduction.    Sciatica of right side  Orders:    predniSONE (DELTASONE) 20 MG tablet; Take 1 tablet (20 mg total) by mouth once daily. for 5 days    X-Ray Hip 2 or 3 views Right with Pelvis when performed; Future    Breast cancer screening by mammogram  Orders:    Mammo Digital Screening Bilat w/ William (XPD); Future    Obesity, unspecified class, unspecified obesity type, unspecified whether serious comorbidity present  Body mass index is 48.53 kg/m².  Goal BMI <30.  Exercise 5 times a week for 30 minutes per day.  Avoid soda, simple sugars, excessive rice, potatoes or bread. Limit fast foods and fried foods.  Choose complex carbs in moderation (example: green vegetables, beans, oatmeal). Eat plenty of fresh fruits and vegetables with lean meats daily.  Do not skip meals. Eat a balanced portion size.  Avoid fad diets. Consider permanent healthy life style changes.         Follow up in about 6 months (around 10/14/2025) for HTN, Hypothyroid. In addition to their scheduled follow up, the patient has also been instructed to follow up on as needed basis.     Future Appointments   Date Time Provider Department Center   4/28/2025  8:00 AM [facility] MAMMO1 [facility] M[facility]MO [facility]   10/14/2025  8:00 AM Mary Fermin FNP [facility]        JOE Montemayor

## 2025-04-17 ENCOUNTER — RESULTS FOLLOW-UP (OUTPATIENT)
Dept: FAMILY MEDICINE | Facility: CLINIC | Age: 55
End: 2025-04-17

## 2025-04-17 ENCOUNTER — HOSPITAL ENCOUNTER (OUTPATIENT)
Dept: RADIOLOGY | Facility: HOSPITAL | Age: 55
Discharge: HOME | End: 2025-04-17
Attending: NURSE PRACTITIONER
Payer: MEDICAID

## 2025-04-17 DIAGNOSIS — M54.31 SCIATICA OF RIGHT SIDE: ICD-10-CM

## 2025-04-17 PROCEDURE — 73502 X-RAY EXAM HIP UNI 2-3 VIEWS: CPT | Mod: TC,RT

## 2025-04-17 NOTE — PROGRESS NOTES
X-ray of right hip shows narrowing of bilateral hip joints which could indicate patient's symptoms.  Would recommend PT if patient is in agreement.

## 2025-04-17 NOTE — PROGRESS NOTES
Wellness labs reviewed.  Glucose elevated at 114.  Low carb, low sugar diet advised.  Cholesterol increased at 206.  Low-fat, low-cholesterol diet advised.  Increase physical activity as tolerated.

## 2025-04-23 LAB — CRC RECOMMENDATION EXT: NORMAL

## 2025-04-28 ENCOUNTER — HOSPITAL ENCOUNTER (OUTPATIENT)
Dept: RADIOLOGY | Facility: HOSPITAL | Age: 55
Discharge: HOME | End: 2025-04-28
Attending: NURSE PRACTITIONER
Payer: MEDICAID

## 2025-04-28 DIAGNOSIS — M25.551 BILATERAL HIP PAIN: ICD-10-CM

## 2025-04-28 DIAGNOSIS — Z12.31 BREAST CANCER SCREENING BY MAMMOGRAM: ICD-10-CM

## 2025-04-28 DIAGNOSIS — M25.552 BILATERAL HIP PAIN: ICD-10-CM

## 2025-04-28 DIAGNOSIS — M54.31 SCIATICA OF RIGHT SIDE: Primary | ICD-10-CM

## 2025-04-28 PROCEDURE — 77067 SCR MAMMO BI INCL CAD: CPT | Mod: TC

## 2025-05-07 ENCOUNTER — TELEPHONE (OUTPATIENT)
Dept: RADIOLOGY | Facility: HOSPITAL | Age: 55
End: 2025-05-07
Payer: MEDICAID

## 2025-05-13 ENCOUNTER — HOSPITAL ENCOUNTER (OUTPATIENT)
Dept: RADIOLOGY | Facility: HOSPITAL | Age: 55
Discharge: HOME | End: 2025-05-13
Attending: NURSE PRACTITIONER
Payer: MEDICAID

## 2025-05-13 DIAGNOSIS — R92.8 ABNORMAL SCREENING MAMMOGRAM: ICD-10-CM

## 2025-05-13 PROCEDURE — 77065 DX MAMMO INCL CAD UNI: CPT | Mod: TC,LT

## 2025-05-13 PROCEDURE — 77061 BREAST TOMOSYNTHESIS UNI: CPT | Mod: 26,LT,, | Performed by: RADIOLOGY

## 2025-05-13 PROCEDURE — 77065 DX MAMMO INCL CAD UNI: CPT | Mod: 26,LT,, | Performed by: RADIOLOGY

## 2025-05-27 DIAGNOSIS — E03.9 HYPOTHYROIDISM, UNSPECIFIED TYPE: ICD-10-CM

## 2025-05-27 RX ORDER — LEVOTHYROXINE SODIUM 25 UG/1
25 TABLET ORAL
Qty: 30 TABLET | Refills: 11 | Status: SHIPPED | OUTPATIENT
Start: 2025-05-27

## 2025-06-19 DIAGNOSIS — M47.812 CERVICAL SPONDYLOSIS: ICD-10-CM

## 2025-06-19 RX ORDER — IBUPROFEN 800 MG/1
800 TABLET, FILM COATED ORAL
Qty: 30 TABLET | Refills: 6 | Status: SHIPPED | OUTPATIENT
Start: 2025-06-19

## 2025-07-30 DIAGNOSIS — Z76.0 MEDICATION REFILL: ICD-10-CM

## 2025-07-30 RX ORDER — BUSPIRONE HYDROCHLORIDE 10 MG/1
10 TABLET ORAL 3 TIMES DAILY
Qty: 90 TABLET | Refills: 11 | Status: SHIPPED | OUTPATIENT
Start: 2025-07-30

## 2025-07-30 RX ORDER — LEVOCETIRIZINE DIHYDROCHLORIDE 5 MG/1
5 TABLET, FILM COATED ORAL NIGHTLY
Qty: 30 TABLET | Refills: 11 | Status: SHIPPED | OUTPATIENT
Start: 2025-07-30

## 2025-08-19 ENCOUNTER — PATIENT OUTREACH (OUTPATIENT)
Dept: ADMINISTRATIVE | Facility: HOSPITAL | Age: 55
End: 2025-08-19
Payer: MEDICAID

## 2025-08-19 DIAGNOSIS — Z76.0 MEDICATION REFILL: ICD-10-CM

## 2025-08-19 RX ORDER — BRIMONIDINE TARTRATE AND TIMOLOL MALEATE 2; 5 MG/ML; MG/ML
SOLUTION OPHTHALMIC
Qty: 5 ML | Refills: 3 | Status: SHIPPED | OUTPATIENT
Start: 2025-08-19

## 2025-08-27 DIAGNOSIS — I10 HYPERTENSION, UNSPECIFIED TYPE: ICD-10-CM

## 2025-08-27 RX ORDER — LISINOPRIL 20 MG/1
20 TABLET ORAL DAILY
Qty: 30 TABLET | Refills: 3 | Status: SHIPPED | OUTPATIENT
Start: 2025-08-27